# Patient Record
Sex: FEMALE | Race: WHITE | NOT HISPANIC OR LATINO | Employment: PART TIME | URBAN - METROPOLITAN AREA
[De-identification: names, ages, dates, MRNs, and addresses within clinical notes are randomized per-mention and may not be internally consistent; named-entity substitution may affect disease eponyms.]

---

## 2018-02-07 ENCOUNTER — HOSPITAL ENCOUNTER (OUTPATIENT)
Dept: OTHER | Facility: HOSPITAL | Age: 38
Setting detail: SPECIMEN
Discharge: HOME | End: 2018-02-07
Attending: OBSTETRICS & GYNECOLOGY | Admitting: OBSTETRICS & GYNECOLOGY

## 2020-03-18 ENCOUNTER — HOSPITAL ENCOUNTER (OUTPATIENT)
Dept: LABOR AND DELIVERY | Facility: HOSPITAL | Age: 40
Discharge: HOME | End: 2020-03-18

## 2020-03-18 ENCOUNTER — HOSPITAL ENCOUNTER (INPATIENT)
Facility: HOSPITAL | Age: 40
LOS: 1 days | Discharge: HOME | End: 2020-03-19
Attending: OBSTETRICS & GYNECOLOGY | Admitting: OBSTETRICS & GYNECOLOGY

## 2020-03-18 ENCOUNTER — ANESTHESIA EVENT (OUTPATIENT)
Dept: LABOR AND DELIVERY | Facility: HOSPITAL | Age: 40
End: 2020-03-18

## 2020-03-18 ENCOUNTER — ANESTHESIA (OUTPATIENT)
Dept: LABOR AND DELIVERY | Facility: HOSPITAL | Age: 40
End: 2020-03-18

## 2020-03-18 PROBLEM — Z34.90 PREGNANT: Status: ACTIVE | Noted: 2020-03-18

## 2020-03-18 LAB
ABO GROUP BLD: NORMAL
BASOPHILS # BLD AUTO: 0 10*3/MM3 (ref 0–0.2)
BASOPHILS NFR BLD AUTO: 0.3 % (ref 0–1.5)
BLD GP AB SCN SERPL QL: NEGATIVE
DEPRECATED RDW RBC AUTO: 45.5 FL (ref 37–54)
EOSINOPHIL # BLD AUTO: 0.1 10*3/MM3 (ref 0–0.4)
EOSINOPHIL NFR BLD AUTO: 1 % (ref 0.3–6.2)
ERYTHROCYTE [DISTWIDTH] IN BLOOD BY AUTOMATED COUNT: 15.7 % (ref 12.3–15.4)
HCT VFR BLD AUTO: 31.5 % (ref 34–46.6)
HGB BLD-MCNC: 10.8 G/DL (ref 12–15.9)
HIV1+2 AB SER QL: NORMAL
LYMPHOCYTES # BLD AUTO: 2.3 10*3/MM3 (ref 0.7–3.1)
LYMPHOCYTES NFR BLD AUTO: 18.4 % (ref 19.6–45.3)
MCH RBC QN AUTO: 28 PG (ref 26.6–33)
MCHC RBC AUTO-ENTMCNC: 34.1 G/DL (ref 31.5–35.7)
MCV RBC AUTO: 82 FL (ref 79–97)
MONOCYTES # BLD AUTO: 1 10*3/MM3 (ref 0.1–0.9)
MONOCYTES NFR BLD AUTO: 8.3 % (ref 5–12)
NEUTROPHILS # BLD AUTO: 8.8 10*3/MM3 (ref 1.7–7)
NEUTROPHILS NFR BLD AUTO: 72 % (ref 42.7–76)
NRBC BLD AUTO-RTO: 0.1 /100 WBC (ref 0–0.2)
PLATELET # BLD AUTO: 187 10*3/MM3 (ref 140–450)
PMV BLD AUTO: 9.9 FL (ref 6–12)
RBC # BLD AUTO: 3.84 10*6/MM3 (ref 3.77–5.28)
RH BLD: POSITIVE
RPR SER QL: NORMAL
T&S EXPIRATION DATE: NORMAL
WBC NRBC COR # BLD: 12.2 10*3/MM3 (ref 3.4–10.8)

## 2020-03-18 PROCEDURE — 86901 BLOOD TYPING SEROLOGIC RH(D): CPT | Performed by: OBSTETRICS & GYNECOLOGY

## 2020-03-18 PROCEDURE — 85025 COMPLETE CBC W/AUTO DIFF WBC: CPT | Performed by: OBSTETRICS & GYNECOLOGY

## 2020-03-18 PROCEDURE — 86900 BLOOD TYPING SEROLOGIC ABO: CPT

## 2020-03-18 PROCEDURE — 86900 BLOOD TYPING SEROLOGIC ABO: CPT | Performed by: OBSTETRICS & GYNECOLOGY

## 2020-03-18 PROCEDURE — 0HQ9XZZ REPAIR PERINEUM SKIN, EXTERNAL APPROACH: ICD-10-PCS | Performed by: OBSTETRICS & GYNECOLOGY

## 2020-03-18 PROCEDURE — G0432 EIA HIV-1/HIV-2 SCREEN: HCPCS | Performed by: OBSTETRICS & GYNECOLOGY

## 2020-03-18 PROCEDURE — 86850 RBC ANTIBODY SCREEN: CPT | Performed by: OBSTETRICS & GYNECOLOGY

## 2020-03-18 PROCEDURE — 86592 SYPHILIS TEST NON-TREP QUAL: CPT | Performed by: OBSTETRICS & GYNECOLOGY

## 2020-03-18 PROCEDURE — 86901 BLOOD TYPING SEROLOGIC RH(D): CPT

## 2020-03-18 PROCEDURE — C1755 CATHETER, INTRASPINAL: HCPCS | Performed by: ANESTHESIOLOGY

## 2020-03-18 RX ORDER — SODIUM CHLORIDE, SODIUM LACTATE, POTASSIUM CHLORIDE, CALCIUM CHLORIDE 600; 310; 30; 20 MG/100ML; MG/100ML; MG/100ML; MG/100ML
125 INJECTION, SOLUTION INTRAVENOUS CONTINUOUS
Status: DISCONTINUED | OUTPATIENT
Start: 2020-03-18 | End: 2020-03-18

## 2020-03-18 RX ORDER — OXYTOCIN-SODIUM CHLORIDE 0.9% IV SOLN 30 UNIT/500ML 30-0.9/5 UT/ML-%
125 SOLUTION INTRAVENOUS CONTINUOUS PRN
Status: DISCONTINUED | OUTPATIENT
Start: 2020-03-18 | End: 2020-03-18 | Stop reason: HOSPADM

## 2020-03-18 RX ORDER — CARBOPROST TROMETHAMINE 250 UG/ML
250 INJECTION, SOLUTION INTRAMUSCULAR AS NEEDED
Status: DISCONTINUED | OUTPATIENT
Start: 2020-03-18 | End: 2020-03-18 | Stop reason: HOSPADM

## 2020-03-18 RX ORDER — ONDANSETRON 4 MG/1
4 TABLET, FILM COATED ORAL EVERY 6 HOURS PRN
Status: DISCONTINUED | OUTPATIENT
Start: 2020-03-18 | End: 2020-03-18

## 2020-03-18 RX ORDER — LIDOCAINE HYDROCHLORIDE AND EPINEPHRINE 10; 10 MG/ML; UG/ML
INJECTION, SOLUTION INFILTRATION; PERINEURAL AS NEEDED
Status: DISCONTINUED | OUTPATIENT
Start: 2020-03-18 | End: 2020-03-18 | Stop reason: SURG

## 2020-03-18 RX ORDER — EPHEDRINE SULFATE 50 MG/ML
5 INJECTION, SOLUTION INTRAVENOUS
Status: DISCONTINUED | OUTPATIENT
Start: 2020-03-18 | End: 2020-03-18

## 2020-03-18 RX ORDER — IBUPROFEN 600 MG/1
600 TABLET ORAL EVERY 6 HOURS PRN
Status: DISCONTINUED | OUTPATIENT
Start: 2020-03-18 | End: 2020-03-19 | Stop reason: HOSPADM

## 2020-03-18 RX ORDER — ONDANSETRON 2 MG/ML
4 INJECTION INTRAMUSCULAR; INTRAVENOUS EVERY 6 HOURS PRN
Status: DISCONTINUED | OUTPATIENT
Start: 2020-03-18 | End: 2020-03-18

## 2020-03-18 RX ORDER — FOLIC ACID 1 MG/1
1 TABLET ORAL DAILY
COMMUNITY
End: 2020-03-19 | Stop reason: HOSPADM

## 2020-03-18 RX ORDER — PRENATAL VIT NO.126/IRON/FOLIC 28MG-0.8MG
1 TABLET ORAL DAILY
COMMUNITY
End: 2022-08-02

## 2020-03-18 RX ORDER — OXYTOCIN-SODIUM CHLORIDE 0.9% IV SOLN 30 UNIT/500ML 30-0.9/5 UT/ML-%
2 SOLUTION INTRAVENOUS
Status: DISCONTINUED | OUTPATIENT
Start: 2020-03-18 | End: 2020-03-18

## 2020-03-18 RX ORDER — MAGNESIUM CARB/ALUMINUM HYDROX 105-160MG
30 TABLET,CHEWABLE ORAL ONCE
Status: DISCONTINUED | OUTPATIENT
Start: 2020-03-18 | End: 2020-03-18

## 2020-03-18 RX ORDER — BUPIVACAINE HYDROCHLORIDE 2.5 MG/ML
INJECTION, SOLUTION EPIDURAL; INFILTRATION; INTRACAUDAL AS NEEDED
Status: DISCONTINUED | OUTPATIENT
Start: 2020-03-18 | End: 2020-03-18 | Stop reason: SURG

## 2020-03-18 RX ORDER — FLUTICASONE PROPIONATE 50 MCG
SPRAY, SUSPENSION (ML) NASAL DAILY
COMMUNITY
End: 2020-03-19 | Stop reason: HOSPADM

## 2020-03-18 RX ORDER — PRENATAL VIT/IRON FUM/FOLIC AC 27MG-0.8MG
1 TABLET ORAL DAILY
Status: DISCONTINUED | OUTPATIENT
Start: 2020-03-18 | End: 2020-03-19 | Stop reason: HOSPADM

## 2020-03-18 RX ORDER — DOCUSATE SODIUM 100 MG/1
100 CAPSULE, LIQUID FILLED ORAL 2 TIMES DAILY
Status: DISCONTINUED | OUTPATIENT
Start: 2020-03-18 | End: 2020-03-19 | Stop reason: HOSPADM

## 2020-03-18 RX ORDER — BUPIVACAINE HYDROCHLORIDE 2.5 MG/ML
INJECTION, SOLUTION EPIDURAL; INFILTRATION; INTRACAUDAL
Status: COMPLETED
Start: 2020-03-18 | End: 2020-03-18

## 2020-03-18 RX ORDER — SODIUM CHLORIDE 0.9 % (FLUSH) 0.9 %
3 SYRINGE (ML) INJECTION EVERY 12 HOURS SCHEDULED
Status: DISCONTINUED | OUTPATIENT
Start: 2020-03-18 | End: 2020-03-18

## 2020-03-18 RX ORDER — OXYTOCIN-SODIUM CHLORIDE 0.9% IV SOLN 30 UNIT/500ML 30-0.9/5 UT/ML-%
250 SOLUTION INTRAVENOUS CONTINUOUS
Status: ACTIVE | OUTPATIENT
Start: 2020-03-18 | End: 2020-03-18

## 2020-03-18 RX ORDER — SODIUM CHLORIDE 0.9 % (FLUSH) 0.9 %
10 SYRINGE (ML) INJECTION AS NEEDED
Status: DISCONTINUED | OUTPATIENT
Start: 2020-03-18 | End: 2020-03-18

## 2020-03-18 RX ORDER — METHYLERGONOVINE MALEATE 0.2 MG/ML
200 INJECTION INTRAVENOUS ONCE AS NEEDED
Status: DISCONTINUED | OUTPATIENT
Start: 2020-03-18 | End: 2020-03-18 | Stop reason: HOSPADM

## 2020-03-18 RX ORDER — LANOLIN 100 %
OINTMENT (GRAM) TOPICAL
Status: DISCONTINUED | OUTPATIENT
Start: 2020-03-18 | End: 2020-03-19 | Stop reason: HOSPADM

## 2020-03-18 RX ORDER — FAMOTIDINE 20 MG/1
20 TABLET, FILM COATED ORAL NIGHTLY PRN
COMMUNITY
End: 2020-03-19 | Stop reason: HOSPADM

## 2020-03-18 RX ORDER — MISOPROSTOL 200 UG/1
800 TABLET ORAL AS NEEDED
Status: DISCONTINUED | OUTPATIENT
Start: 2020-03-18 | End: 2020-03-18 | Stop reason: HOSPADM

## 2020-03-18 RX ORDER — LIDOCAINE HYDROCHLORIDE 10 MG/ML
5 INJECTION, SOLUTION EPIDURAL; INFILTRATION; INTRACAUDAL; PERINEURAL AS NEEDED
Status: DISCONTINUED | OUTPATIENT
Start: 2020-03-18 | End: 2020-03-18

## 2020-03-18 RX ORDER — ONDANSETRON 4 MG/1
4 TABLET, FILM COATED ORAL EVERY 8 HOURS PRN
Status: DISCONTINUED | OUTPATIENT
Start: 2020-03-18 | End: 2020-03-19 | Stop reason: HOSPADM

## 2020-03-18 RX ORDER — SODIUM CHLORIDE 0.9 % (FLUSH) 0.9 %
1-10 SYRINGE (ML) INJECTION AS NEEDED
Status: DISCONTINUED | OUTPATIENT
Start: 2020-03-18 | End: 2020-03-19 | Stop reason: HOSPADM

## 2020-03-18 RX ORDER — OXYTOCIN-SODIUM CHLORIDE 0.9% IV SOLN 30 UNIT/500ML 30-0.9/5 UT/ML-%
999 SOLUTION INTRAVENOUS ONCE
Status: DISCONTINUED | OUTPATIENT
Start: 2020-03-18 | End: 2020-03-18 | Stop reason: HOSPADM

## 2020-03-18 RX ADMIN — SODIUM CHLORIDE, SODIUM LACTATE, POTASSIUM CHLORIDE, AND CALCIUM CHLORIDE 1000 ML: 600; 310; 30; 20 INJECTION, SOLUTION INTRAVENOUS at 07:51

## 2020-03-18 RX ADMIN — BUPIVACAINE HYDROCHLORIDE 20 MG: 2.5 INJECTION, SOLUTION EPIDURAL; INFILTRATION; INTRACAUDAL; PERINEURAL at 11:32

## 2020-03-18 RX ADMIN — IBUPROFEN 600 MG: 600 TABLET, FILM COATED ORAL at 22:36

## 2020-03-18 RX ADMIN — LIDOCAINE HYDROCHLORIDE,EPINEPHRINE BITARTRATE 3 ML: 10; .01 INJECTION, SOLUTION INFILTRATION; PERINEURAL at 09:52

## 2020-03-18 RX ADMIN — Medication 10 ML/HR: at 09:55

## 2020-03-18 RX ADMIN — SODIUM CHLORIDE, SODIUM LACTATE, POTASSIUM CHLORIDE, AND CALCIUM CHLORIDE 125 ML/HR: 600; 310; 30; 20 INJECTION, SOLUTION INTRAVENOUS at 08:38

## 2020-03-18 RX ADMIN — PRENATAL VIT W/ FE FUMARATE-FA TAB 27-0.8 MG 1 TABLET: 27-0.8 TAB at 15:22

## 2020-03-18 RX ADMIN — DOCUSATE SODIUM 100 MG: 100 CAPSULE, LIQUID FILLED ORAL at 20:13

## 2020-03-18 RX ADMIN — OXYTOCIN 2 MILLI-UNITS/MIN: 10 INJECTION INTRAVENOUS at 07:50

## 2020-03-18 RX ADMIN — WITCH HAZEL 1 PAD: 500 SOLUTION RECTAL; TOPICAL at 15:22

## 2020-03-18 RX ADMIN — Medication: at 22:36

## 2020-03-18 RX ADMIN — IBUPROFEN 600 MG: 600 TABLET, FILM COATED ORAL at 15:22

## 2020-03-18 NOTE — PLAN OF CARE
Pt. Was a vaginal delivery under epidural anethesia- extra bolus was given prior to delivery. Pt. Remains unable to move (L) leg at present  and is unable to assit transfer to wheelchair. Breastfeeding well and fundus firm at -2 below umbilicus.

## 2020-03-18 NOTE — ANESTHESIA PROCEDURE NOTES
Labor Epidural    Pre-sedation assessment completed: 3/18/2020 9:40 AM    Patient reassessed immediately prior to procedure    Patient location during procedure: OB  Start Time: 3/18/2020 9:45 AM  Stop Time: 3/18/2020 9:56 AM  Performed By  Anesthesiologist: Isai Santos MD  Preanesthetic Checklist  Completed: patient identified, site marked, surgical consent, pre-op evaluation, timeout performed, IV checked, risks and benefits discussed and monitors and equipment checked  Prep:  Pt Position:sitting  Sterile Tech:sterile barrier, mask, cap and gloves  Prep:chlorhexidine gluconate and isopropyl alcohol  Monitoring:blood pressure monitoring, continuous pulse oximetry and EKG  Epidural Block Procedure:  Approach:midline  Guidance:landmark technique  Location:L3-L4  Needle Type:Tuohy  Needle Gauge:18 G  Loss of Resistance Medium: air  Loss of Resistance: 7cm  Paresthesia: none  Aspiration:negative  Test Dose:negative  Number of Attempts: 1  Post Assessment:  Dressing:secured with tape, occlusive dressing applied and biopatch applied  Pt Tolerance:patient tolerated the procedure well with no apparent complications  Complications:no

## 2020-03-18 NOTE — ANESTHESIA POSTPROCEDURE EVALUATION
Patient: Carly Baron    Procedure Summary     Date:  03/18/20 Room / Location:      Anesthesia Start:  0941 Anesthesia Stop:  1404    Procedure:  LABOR ANALGESIA Diagnosis:      Scheduled Providers:   Provider:  Isai Santos MD    Anesthesia Type:  epidural ASA Status:  2          Anesthesia Type: epidural    Vitals  Vitals Value Taken Time   /82 3/18/2020  3:46 PM   Temp 98.4 °F (36.9 °C) 3/18/2020  2:45 PM   Pulse 83 3/18/2020  3:46 PM   Resp 17 3/18/2020  3:00 PM   SpO2 98 % 3/18/2020  2:25 PM   Vitals shown include unvalidated device data.        Post Anesthesia Care and Evaluation    Patient location during evaluation: PACU  Patient participation: complete - patient participated  Level of consciousness: awake  Pain scale: See nurse's notes for pain score.  Pain management: adequate  Airway patency: patent  Anesthetic complications: No anesthetic complications  PONV Status: none  Cardiovascular status: acceptable  Respiratory status: acceptable  Hydration status: acceptable    Comments: Patient seen and examined postoperatively; vital signs stable; SpO2 greater than or equal to 90%; cardiopulmonary status stable; nausea/vomiting adequately controlled; pain adequately controlled; no apparent anesthesia complications; patient discharged from anesthesia care when discharge criteria were met

## 2020-03-18 NOTE — H&P
KIMBERLY Hernandez  Obstetric History and Physical     Chief Complaint: Here for induction of labor    Subjective     Patient is a 39 y.o. female  currently at 39w0d, who presents with induction of labor.    Her prenatal care is benign.  Her previous obstetric/gynecological history is noted for is non-contributory.      Prenatal Information:  Prenatal Results     POC Urine Glucose/Protein     Test Value Reference Range Date Time    Urine Glucose        Urine Protein              Initial Prenatal Labs     Test Value Reference Range Date Time    Hemoglobin        Hematocrit        Platelets 187 10*3/mm3 140 - 450 20 0730    Rubella IgG        Hepatitis B SAg        Hepatitis C Ab        RPR        ABO O   20 0730    Rh Positive   20 0730    Antibody Screen        HIV Non-Reactive  Non-Reactive 20 0730    Urine Culture        Gonorrhea        Chlamydia        TSH              2nd and 3rd Trimester     Test Value Reference Range Date Time    Hemoglobin (repeated) 10.8 g/dL 12.0 - 15.9 20 0730    Hematocrit (repeated) 31.5 % 34.0 - 46.6 20 0730    GCT        Antibody Screen (repeated) Negative   20 0730    GTT Fasting        GTT 1 Hr        GTT 2 Hr        GTT 3 Hr        Group B Strep              Drug Screening     Test Value Reference Range Date Time    Amphetamine Screen        Barbiturate Screen        Benzodiazepine Screen        Methadone Screen        Phencyclidine Screen        Opiates Screen        THC Screen        Cocaine Screen        Propoxyphene Screen        Buprenorphine Screen        Methamphetamine Screen        Oxycodone Screen        Tricyclic Antidepressants Screen              Other (Risk screening)     Test Value Reference Range Date Time    Varicella IgG        Parvovirus IgG        CMV IgG        Cystic Fibrosis        Hemoglobin electrophoresis        NIPT        MSAFP-4        AFP (for NTD only)                  External Prenatal Results     Pregnancy  Outside Results - Transcribed From Office Records - See Scanned Records For Details     Test Value Date Time    Hgb 10.8 g/dL 03/18/20 0730    Hct 31.5 % 03/18/20 0730    ABO O  03/18/20 0730    Rh Positive  03/18/20 0730    Antibody Screen Negative  03/18/20 0730    Glucose Fasting GTT       Glucose Tolerance Test 1 hour       Glucose Tolerance Test 3 hour       Gonorrhea (discrete)       Chlamydia (discrete)       RPR       VDRL       Syphilis Antibody       Rubella       HBsAg       Herpes Simplex Virus PCR       Herpes Simplex VIrus Culture       HIV Non-Reactive  03/18/20 0730    Hep C RNA Quant PCR       Hep C Antibody       AFP       Group B Strep       GBS Susceptibility to Clindamycin       GBS Susceptibility to Erythromycin       Fetal Fibronectin       Genetic Testing, Maternal Blood             Drug Screening     Test Value Date Time    Urine Drug Screen       Amphetamine Screen NEGATIVE  12/12/17 2020    Barbiturate Screen NEGATIVE  12/12/17 2020    Benzodiazepine Screen NEGATIVE  12/12/17 2020    Methadone Screen NEGATIVE  12/12/17 2020    Phencyclidine Screen NEGATIVE  12/12/17 2020    Opiates Screen       THC Screen NEGATIVE  12/12/17 2020    Cocaine Screen       Propoxyphene Screen       Buprenorphine Screen       Methamphetamine Screen       Oxycodone Screen       Tricyclic Antidepressants Screen                    Past OB History: 1 prior vaginal birth 9 pound 12 ounce baby       Past Medical History: No past medical history on file.     Past Surgical History Past Surgical History:   Procedure Laterality Date   • SINUS SURGERY     • WISDOM TOOTH EXTRACTION          Family History: No family history on file.   Social History:  reports that she has never smoked. She has never used smokeless tobacco.   reports that she drank alcohol.   reports that she has current or past drug history.        General ROS: Pertinent items are noted in HPI    Objective      Vitals:     Vitals:    03/18/20 1050 03/18/20  1056 03/18/20 1100 03/18/20 1130   BP:   117/76 126/75   Pulse: 83 80 76 84   Resp:       Temp:   98.6 °F (37 °C)    TempSrc:   Axillary    SpO2:   100% 100%       Fetal Heart Rate Assessment:   Category 1    Donovan:   Every 1 to 3 minutes     Physical Exam:     General Appearance:    Alert, cooperative, in no acute distress   Lungs:     Clear to auscultation,respirations regular.    Heart:    Regular rhythm and normal rate.   Breast Exam:    Deferred   Abdomen:     Normal bowel sounds, no masses, soft non-tender,         non-distended, no guarding, no rebound tenderness   Pelvic Exam:   Cervix 3 cm 80% effaced -1 station artificial rupture of membranes complete this morning clear fluid return.  Infant cephalic estimated fetal weight 8-1/2 pounds by Leopold's pelvis adequate    Presentation:     Cervix:    Extremities:   Moves all extremities well, no edema, no cyanosis, no              redness   Skin:   No bleeding, bruising or rash   Neurologic:   No focal neurologic defect          Laboratory Results:   Lab Results (last 48 hours)     Procedure Component Value Units Date/Time    HIV-1 & HIV-2 Antibodies [[id number]]  (Normal) Collected:  03/18/20 0730    Specimen:  Blood Updated:  03/18/20 0840     HIV-1/ HIV-2 Non-Reactive     Comment: A non-reactive test result does not preclude the possibility of exposure to HIV or infection with HIV. An antibody response to recent exposure may take several months to reach detectable levels.       Narrative:       The HIV antibody/antigen combo assay is a qualitative assay for HIV that includes the p24 antigen as well as antibodies to HIV types 1 and 2. This test is intended to be used as a screening assay in the diagnosis of HIV infection in patients over the age of 2.  Results may be falsely decreased if patient taking Biotin.      CBC & Differential [[id number]] Collected:  03/18/20 0730    Specimen:  Blood Updated:  03/18/20 0750    Narrative:       The following orders were  created for panel order CBC & Differential.  Procedure                               Abnormality         Status                     ---------                               -----------         ------                     CBC Auto Differential[[id number]]        Abnormal            Final result                 Please view results for these tests on the individual orders.    CBC Auto Differential [[id number]]  (Abnormal) Collected:  03/18/20 0730    Specimen:  Blood Updated:  03/18/20 0750     WBC 12.20 10*3/mm3      RBC 3.84 10*6/mm3      Hemoglobin 10.8 g/dL      Hematocrit 31.5 %      MCV 82.0 fL      MCH 28.0 pg      MCHC 34.1 g/dL      RDW 15.7 %      RDW-SD 45.5 fl      MPV 9.9 fL      Platelets 187 10*3/mm3      Neutrophil % 72.0 %      Lymphocyte % 18.4 %      Monocyte % 8.3 %      Eosinophil % 1.0 %      Basophil % 0.3 %      Neutrophils, Absolute 8.80 10*3/mm3      Lymphocytes, Absolute 2.30 10*3/mm3      Monocytes, Absolute 1.00 10*3/mm3      Eosinophils, Absolute 0.10 10*3/mm3      Basophils, Absolute 0.00 10*3/mm3      nRBC 0.1 /100 WBC     RPR [[id number]] Collected:  03/18/20 0730    Specimen:  Blood Updated:  03/18/20 0745          Other Studies:      Assessment/Plan     Active Problems:    Pregnant         Assessment:  1.  Intrauterine pregnancy at 39w0d gestation with reactive fetal status.    2.  labor  with ROM and GBS negative   3.  Obstetrical history significant for is non-contributory.  4.  GBS status: No results found for: STREPGPB    Plan:  1. Vaginal anticipated  2. Plan of care has been reviewed with patient.  3.  Risks, benefits of treatment plan have been discussed.  4.  All questions have been answered.  5.  Expect vaginal birth       Orlando Gutierrez MD   3/18/2020   11:58

## 2020-03-18 NOTE — PLAN OF CARE
Pt. Delivererd vaginally under epidural anethesia- remains unable to move (L) leg to assit with transfer to wheelchair. Pt. Was unable to void after recovery and was straight cathed and is due to void at 2000. Breastfeeds well,fundus remains firm at -2 below umbilicus.

## 2020-03-18 NOTE — ANESTHESIA PREPROCEDURE EVALUATION
Anesthesia Evaluation     no history of anesthetic complications:  NPO Solid Status: > 4 hours  NPO Liquid Status: > 4 hours           Airway   Mallampati: II  TM distance: >3 FB  No difficulty expected  Dental - normal exam     Pulmonary    Cardiovascular   Exercise tolerance: good (4-7 METS)        Neuro/Psych  GI/Hepatic/Renal/Endo      Musculoskeletal     Abdominal    Substance History      OB/GYN    (+) Pregnant,         Other                        Anesthesia Plan    ASA 2     epidural       Anesthetic plan, all risks, benefits, and alternatives have been provided, discussed and informed consent has been obtained with: patient.

## 2020-03-18 NOTE — L&D DELIVERY NOTE
[facility]  Vaginal Delivery Note    Diagnosis     Patient is a 39 y.o. female  currently at 39w0d, who presents with elective induction of labor.      Delivery     Delivery:  Spontaneous Vaginal Delivery    Date of Delivery:  3/18/2020   Anesthesia:       Delivering clinician: Orlando Gutierrez MD      Delivery narrative: This patient presented to labor and delivery for induction of labor her starting cervical exam was 3 cm 80% effaced -2 station she was given Pitocin and artificially ruptured with clear fluid return.  She received an epidural for pain control she had a reactive category 1 tracing throughout her course once complete complete +2 station with the urge to push she pushed with excellent effort for approximately 10 minutes delivered over an intact perineum a live viable male infant with Apgars of 9 and 10 at 1 and 5 minutes infant had good cry color and tone was moving all 4 extremities upon delivery.  Infant was bulb suctioned after complete delivery cord was clamped cut the infant was placed on mother's abdomen.  Cord blood was sent for analysis Pitocin was given IV the placenta delivered spontaneously intact with a three-vessel cord and was set aside.  The uterus cervix and vagina were explored and a first-degree laceration was found repaired with 3-0 Vicryl in the normal fashion.  The patient tolerated the procedure well sponge lap needle counts were correct the fundus was firm and hemostasis was noted at the end of the case mom and baby are doing well at the time of this dictation    Infant    Findings: VMI     Apgars:  9 and 10 at 1 and 5 minutes.      Placenta, Cord, and Fluid    Placenta delivered  spontaneous  3VC          Lacerations       had a 1st degree lacteration, which was repair. with 3.0 Vicryl rapide in the usual fashion.     Estimated Blood Loss 300cc     Complications  none    Disposition  Mother to Mother Baby/Postpartum  in stable condition currently.  Baby to remains with  mom  in stable condition currently.      Orlando Gutierrez MD  03/18/20  14:18

## 2020-03-19 VITALS
BODY MASS INDEX: 32.95 KG/M2 | HEIGHT: 70 IN | DIASTOLIC BLOOD PRESSURE: 74 MMHG | HEART RATE: 90 BPM | WEIGHT: 230.16 LBS | SYSTOLIC BLOOD PRESSURE: 113 MMHG | TEMPERATURE: 98.1 F | OXYGEN SATURATION: 99 % | RESPIRATION RATE: 18 BRPM

## 2020-03-19 PROBLEM — Z34.90 PREGNANT: Status: RESOLVED | Noted: 2020-03-18 | Resolved: 2020-03-19

## 2020-03-19 LAB
BASOPHILS # BLD AUTO: 0.1 10*3/MM3 (ref 0–0.2)
BASOPHILS NFR BLD AUTO: 0.4 % (ref 0–1.5)
DEPRECATED RDW RBC AUTO: 45.1 FL (ref 37–54)
EOSINOPHIL # BLD AUTO: 0.1 10*3/MM3 (ref 0–0.4)
EOSINOPHIL NFR BLD AUTO: 0.6 % (ref 0.3–6.2)
ERYTHROCYTE [DISTWIDTH] IN BLOOD BY AUTOMATED COUNT: 15.4 % (ref 12.3–15.4)
HCT VFR BLD AUTO: 29.8 % (ref 34–46.6)
HGB BLD-MCNC: 9.8 G/DL (ref 12–15.9)
LYMPHOCYTES # BLD AUTO: 2.3 10*3/MM3 (ref 0.7–3.1)
LYMPHOCYTES NFR BLD AUTO: 14.9 % (ref 19.6–45.3)
MCH RBC QN AUTO: 26.9 PG (ref 26.6–33)
MCHC RBC AUTO-ENTMCNC: 32.8 G/DL (ref 31.5–35.7)
MCV RBC AUTO: 82.2 FL (ref 79–97)
MONOCYTES # BLD AUTO: 0.8 10*3/MM3 (ref 0.1–0.9)
MONOCYTES NFR BLD AUTO: 5.6 % (ref 5–12)
NEUTROPHILS # BLD AUTO: 12 10*3/MM3 (ref 1.7–7)
NEUTROPHILS NFR BLD AUTO: 78.5 % (ref 42.7–76)
NRBC BLD AUTO-RTO: 0.1 /100 WBC (ref 0–0.2)
PLATELET # BLD AUTO: 179 10*3/MM3 (ref 140–450)
PMV BLD AUTO: 9.7 FL (ref 6–12)
RBC # BLD AUTO: 3.62 10*6/MM3 (ref 3.77–5.28)
WBC NRBC COR # BLD: 15.2 10*3/MM3 (ref 3.4–10.8)

## 2020-03-19 PROCEDURE — 85025 COMPLETE CBC W/AUTO DIFF WBC: CPT | Performed by: OBSTETRICS & GYNECOLOGY

## 2020-03-19 RX ORDER — IBUPROFEN 600 MG/1
600 TABLET ORAL EVERY 6 HOURS PRN
Qty: 30 TABLET | Refills: 0 | Status: SHIPPED | OUTPATIENT
Start: 2020-03-19 | End: 2022-08-02

## 2020-03-19 RX ADMIN — DOCUSATE SODIUM 100 MG: 100 CAPSULE, LIQUID FILLED ORAL at 09:45

## 2020-03-19 RX ADMIN — PRENATAL VIT W/ FE FUMARATE-FA TAB 27-0.8 MG 1 TABLET: 27-0.8 TAB at 09:45

## 2020-03-19 RX ADMIN — IBUPROFEN 600 MG: 600 TABLET, FILM COATED ORAL at 11:28

## 2020-03-19 RX ADMIN — IBUPROFEN 600 MG: 600 TABLET, FILM COATED ORAL at 04:50

## 2020-03-19 RX ADMIN — BENZOCAINE 1 SPRAY: 11.4 AEROSOL, SPRAY TOPICAL at 08:25

## 2020-03-19 NOTE — PLAN OF CARE
Pt controlling pain with PRN motrin. Breastfeeding baby successfully every 2-3 hours. Having some nipple discomfort with feedings. Talked to NP about plan. Planning on early discharge this afternoon once baby clears tests.

## 2020-03-19 NOTE — PLAN OF CARE
Problem: Patient Care Overview  Goal: Plan of Care Review  Outcome: Ongoing (interventions implemented as appropriate)  Flowsheets (Taken 3/19/2020 7299)  Progress: improving  Plan of Care Reviewed With: patient  Outcome Summary: Pt able to rest throughout night. Motrin given for pain control. Pt voiding wnl. No complications at this time. Breastfeeding well. Will continue to monitor  Goal: Individualization and Mutuality  Outcome: Ongoing (interventions implemented as appropriate)  Goal: Discharge Needs Assessment  Outcome: Ongoing (interventions implemented as appropriate)  Goal: Interprofessional Rounds/Family Conf  Outcome: Ongoing (interventions implemented as appropriate)     Problem: Postpartum (Vaginal Delivery) (Adult,Obstetrics,Pediatric)  Goal: Signs and Symptoms of Listed Potential Problems Will be Absent, Minimized or Managed (Postpartum)  Outcome: Ongoing (interventions implemented as appropriate)      Mother encouraged to provide breastmilk for her NICU baby. Benefits of breastmilk and early stimulation discussed with mother. Mother declines pumping at this time. Mother states she is just too exhausted and has been up for 23 hours. Mother encouraged to inform nurse if she changes her mind.

## 2020-03-19 NOTE — DISCHARGE SUMMARY
[facility]  Delivery Discharge Summary    Primary OB Clinician: Orlando Gutierrez MD    Admission Diagnosis: TIUP  Active Problems:    * No active hospital problems. *      Discharge Diagnosis:  Same; delivered    Gestational Age: 39w0d    Date of Delivery: 3/18/2020     Delivered By:        Delivery Type: Vaginal, Spontaneous      Tubal Ligation: n/a    Intrapartum Course: Uncomplicated delivery.     Postpartum Course:  Pt was admitted and underwent  Spontaneous Vaginal Delivery. Pt was transferred to PP where she had an uncomplicated course. Pt remained AFVSS, had scant lochia and pain was well controlled. Pt d/c home in stable condition and will f/u in office for PP visit as scheduled or PRN. Currently breastfeeding. Plans on OCP  for contraception.     Physical Exam:    Vitals:   Vitals:    20 2237 20 0240 20 0241 20 0638   BP: 125/79  118/73 115/75   BP Location: Right arm  Right arm Right arm   Patient Position: Sitting  Lying Sitting   Pulse: 105  89 88   Resp: 16 16 23 16   Temp: 97.8 °F (36.6 °C)  98.5 °F (36.9 °C) 97.7 °F (36.5 °C)   TempSrc: Oral  Oral Oral   SpO2: 97%  98% 98%   Weight:       Height:         Temp (24hrs), Av.2 °F (36.8 °C), Min:97.7 °F (36.5 °C), Max:98.6 °F (37 °C)      General Appearance:    Alert, cooperative, in no acute distress   Abdomen:     Soft non-tender, non-distended, no guarding, no rebound         tenderness.   Extremities:   Moves all extremities well, no edema, no cyanosis, no              Redness.   Incision:  N/A   Fundus:   Firm, below umbilicus     Feeding method: Breastfeeding Status: Yes    Labs:  Results from last 7 days   Lab Units 20  0730   WBC 10*3/mm3 12.20*   HEMOGLOBIN g/dL 10.8*   HEMATOCRIT % 31.5*   PLATELETS 10*3/mm3 187           Blood Type: RH Positive      Plan:  Discharge to home.    Follow-up appointment with Dr Gutierrez in 6 weeks. Will check PP CBC prior to discharge. Pt asymptomatic at time of d/c.     Wanda  MILLICENT Barajas  3/19/2020  10:14

## 2020-03-19 NOTE — LACTATION NOTE
This note was copied from a baby's chart.  Pt denies hx of breast surgery, no allergy to wool or foods. Medela gel patches provided, instructed.  States she bf her 4 yo x 4 mo, plans to return to work in about 8 weeks, she has a spectra pump.  Teaching done. Assisted to position, demo wide latch. Nursing readily. Mild nipple tenderness, skin care products in use.  Plans d/c home today, will follow up as needed.

## 2021-04-26 RX ORDER — ESCITALOPRAM OXALATE 20 MG/1
20 TABLET ORAL DAILY
Qty: 30 TABLET | Refills: 1 | Status: CANCELLED | OUTPATIENT
Start: 2021-04-26

## 2021-04-26 RX ORDER — NORGESTIMATE AND ETHINYL ESTRADIOL 7DAYSX3 LO
1 KIT ORAL DAILY
Qty: 84 TABLET | Refills: 1 | Status: CANCELLED | OUTPATIENT
Start: 2021-04-26

## 2021-05-03 RX ORDER — NORGESTIMATE AND ETHINYL ESTRADIOL 7DAYSX3 LO
1 KIT ORAL DAILY
Qty: 84 TABLET | Refills: 1 | Status: CANCELLED | OUTPATIENT
Start: 2021-04-26

## 2021-05-03 RX ORDER — ESCITALOPRAM OXALATE 20 MG/1
20 TABLET ORAL DAILY
Qty: 30 TABLET | Refills: 1 | Status: CANCELLED | OUTPATIENT
Start: 2021-04-26

## 2022-08-02 ENCOUNTER — OFFICE VISIT (OUTPATIENT)
Dept: FAMILY MEDICINE CLINIC | Facility: CLINIC | Age: 42
End: 2022-08-02

## 2022-08-02 VITALS
HEIGHT: 70 IN | SYSTOLIC BLOOD PRESSURE: 108 MMHG | BODY MASS INDEX: 33.93 KG/M2 | DIASTOLIC BLOOD PRESSURE: 80 MMHG | WEIGHT: 237 LBS | HEART RATE: 78 BPM | OXYGEN SATURATION: 97 %

## 2022-08-02 DIAGNOSIS — J30.1 SEASONAL ALLERGIC RHINITIS DUE TO POLLEN: ICD-10-CM

## 2022-08-02 DIAGNOSIS — Z12.31 BREAST CANCER SCREENING BY MAMMOGRAM: ICD-10-CM

## 2022-08-02 DIAGNOSIS — R06.83 LOUD SNORING: ICD-10-CM

## 2022-08-02 DIAGNOSIS — R53.83 FATIGUE, UNSPECIFIED TYPE: ICD-10-CM

## 2022-08-02 DIAGNOSIS — F41.9 ANXIETY: ICD-10-CM

## 2022-08-02 DIAGNOSIS — F32.0 CURRENT MILD EPISODE OF MAJOR DEPRESSIVE DISORDER WITHOUT PRIOR EPISODE: ICD-10-CM

## 2022-08-02 DIAGNOSIS — Z00.00 PREVENTATIVE HEALTH CARE: Primary | ICD-10-CM

## 2022-08-02 LAB — HBA1C MFR BLD: 4.9 % (ref 3.5–5.6)

## 2022-08-02 PROCEDURE — 86803 HEPATITIS C AB TEST: CPT | Performed by: NURSE PRACTITIONER

## 2022-08-02 PROCEDURE — 82607 VITAMIN B-12: CPT | Performed by: NURSE PRACTITIONER

## 2022-08-02 PROCEDURE — 99386 PREV VISIT NEW AGE 40-64: CPT | Performed by: NURSE PRACTITIONER

## 2022-08-02 PROCEDURE — 80050 GENERAL HEALTH PANEL: CPT | Performed by: NURSE PRACTITIONER

## 2022-08-02 PROCEDURE — 36415 COLL VENOUS BLD VENIPUNCTURE: CPT | Performed by: NURSE PRACTITIONER

## 2022-08-02 PROCEDURE — 82306 VITAMIN D 25 HYDROXY: CPT | Performed by: NURSE PRACTITIONER

## 2022-08-02 PROCEDURE — 83036 HEMOGLOBIN GLYCOSYLATED A1C: CPT | Performed by: NURSE PRACTITIONER

## 2022-08-02 PROCEDURE — 80061 LIPID PANEL: CPT | Performed by: NURSE PRACTITIONER

## 2022-08-02 RX ORDER — DIPHENOXYLATE HYDROCHLORIDE AND ATROPINE SULFATE 2.5; .025 MG/1; MG/1
TABLET ORAL DAILY
COMMUNITY

## 2022-08-02 RX ORDER — HYDROXYZINE HYDROCHLORIDE 10 MG/1
10 TABLET, FILM COATED ORAL 3 TIMES DAILY PRN
Qty: 30 TABLET | Refills: 1 | Status: SHIPPED | OUTPATIENT
Start: 2022-08-02 | End: 2023-03-16 | Stop reason: SDUPTHER

## 2022-08-02 RX ORDER — FLUOXETINE 10 MG/1
10 CAPSULE ORAL DAILY
Qty: 30 CAPSULE | Refills: 1 | Status: SHIPPED | OUTPATIENT
Start: 2022-08-02 | End: 2022-09-14 | Stop reason: SDUPTHER

## 2022-08-02 NOTE — PROGRESS NOTES
"Chief Complaint  Chief Complaint   Patient presents with   • Annual Exam     Would like a mammo order if she's due.   • Establish Care   • Anxiety     Has been taking lexapro but would like to try something else.   • Sleep Apnea     She reports her dentist said she should have sleep study.  Reports she snores loudly, fatigue, teeth grinding.           Subjective          Carly Baron presents to [facility] for   History of Present Illness     New 41-year-old female patient presents today for annual exam and to establish care with new provider.  She has past medical history of seasonal allergies, anxiety and depression.  The patient denies dysuria, constipation, GERD, chest pain, shortness of air, palpitations and swelling of the extremities.     She complains of loud snoring, severe daytime fatigue and grinding of her teeth. Her  has told her she snores, is unsure of witnessed apnea.    Anxiety/depression, patient was started on Lexapro by GYN following delivery of her last child, she ran out of refills and has been off medication for approx 2mo. She reports having some improvement on Lexapro but still worried and was extremely anxious. She states she worries all the time, her father  at young age, mother recently. Has 2 children and constantly worries about them and \"what if something would happen to her\".  She is a stress eater, typically when anxious she snacks and reports weight gain. Patient reports fatigue, impaired concentration, trouble falling asleep, she uses CBD nightly.  Denies psychomotor agitation, psychomotor retardation, feelings of worthlessness (guilt), thoughts of death or suicide.       She follows with OB/GYN for Pap smears, currently on OCP    Allergic rhinitis, stable on OTC allergy meds, reports seasonal rhinitis, postnasal drip currently controlled, denies symptoms of asthma including cough, wheezing, shortness of air and dizziness.  "     Left ankle inj after a fall at [employer], has been applying ice, wearing a brace and symptoms improving.       The following portions of the patient's history were reviewed and updated as appropriate: allergies, current medications, past family history, past medical history, past social history, past surgical history and problem list.    Past Medical History:   Diagnosis Date   • Allergic    • Anxiety    • Depression      Past Surgical History:   Procedure Laterality Date   • SINUS SURGERY     • WISDOM TOOTH EXTRACTION       Family History   Problem Relation Age of Onset   • Lung cancer Mother    • Early death Father    • Hypertension Father    • Cataracts Father    • No Known Problems Brother    • No Known Problems Brother    • No Known Problems Son    • No Known Problems Son    • No Known Problems Maternal Aunt    • No Known Problems Maternal Uncle    • Bone cancer Paternal Aunt    • No Known Problems Maternal Grandmother    • Parkinsonism Maternal Grandfather    • Parkinsonism Paternal Grandmother    • Heart disease Paternal Grandfather    • Diabetes Paternal Grandfather      Social History     Tobacco Use   • Smoking status: Never Smoker   • Smokeless tobacco: Never Used   Substance Use Topics   • Alcohol use: Not Currently       Current Outpatient Medications:   •  CBD (cannabidiol) oral oil, Take  by mouth., Disp: , Rfl:   •  Fluticasone Propionate (FLONASE NA), into the nostril(s) as directed by provider., Disp: , Rfl:   •  Loratadine (CLARITIN PO), Take  by mouth., Disp: , Rfl:   •  multivitamin (MULTIVITAMIN PO), Take  by mouth Daily., Disp: , Rfl:   •  Naproxen Sodium (ALEVE PO), Take  by mouth., Disp: , Rfl:   •  valACYclovir (VALTREX) 1000 MG tablet, Take 1 tablet by mouth daily, Disp: 30 tablet, Rfl: 6  •  FLUoxetine (PROzac) 10 MG capsule, Take 1 capsule by mouth Daily., Disp: 30 capsule, Rfl: 1  •  hydrOXYzine (ATARAX) 10 MG tablet, Take 1 tablet by mouth 3 (Three) Times a Day As Needed for  "Anxiety., Disp: 30 tablet, Rfl: 1  •  norgestimate-ethinyl estradiol (ORTHO TRI-CYCLEN LO) 0.18/0.215/0.25 MG-25 MCG per tablet, Take 1 tablet by mouth daily, Disp: 84 tablet, Rfl: 4    Objective   Vital Signs:   /80 (BP Location: Right arm, Patient Position: Sitting, Cuff Size: Large Adult)   Pulse 78   Ht 177.8 cm (70\")   Wt 108 kg (237 lb)   SpO2 97%   BMI 34.01 kg/m²           Physical Exam  Vitals and nursing note reviewed.   Constitutional:       General: She is not in acute distress.     Appearance: Normal appearance. She is well-developed. She is obese. She is not diaphoretic.   HENT:      Head: Normocephalic.      Nose: No congestion.      Mouth/Throat:      Mouth: Mucous membranes are moist.   Eyes:      Conjunctiva/sclera: Conjunctivae normal.      Pupils: Pupils are equal, round, and reactive to light.   Neck:      Thyroid: No thyromegaly.      Vascular: No JVD.   Cardiovascular:      Rate and Rhythm: Normal rate and regular rhythm.      Heart sounds: Normal heart sounds. No murmur heard.  Pulmonary:      Effort: Pulmonary effort is normal. No respiratory distress.      Breath sounds: Normal breath sounds. No wheezing or rhonchi.   Abdominal:      General: Bowel sounds are normal. There is no distension.      Palpations: Abdomen is soft.      Tenderness: There is no abdominal tenderness.   Musculoskeletal:         General: Signs of injury (Wearing supportive splint/brace on left ankle) present. No swelling or tenderness. Normal range of motion.      Cervical back: Normal range of motion and neck supple. No rigidity.   Skin:     General: Skin is warm and dry.      Coloration: Skin is not pale.   Neurological:      Mental Status: She is alert and oriented to person, place, and time.      Sensory: No sensory deficit.      Motor: No weakness.      Gait: Gait normal.   Psychiatric:         Attention and Perception: Attention normal.         Mood and Affect: Mood is anxious and depressed. Affect is " tearful.         Behavior: Behavior normal.         Thought Content: Thought content normal.         Judgment: Judgment normal.          Result Review :     No visits with results within 7 Day(s) from this visit.   Latest known visit with results is:   Lab Requisition on 07/19/2022   Component Date Value Ref Range Status   • Varicella IgG 07/19/2022 Immune  Immune Final   • Mumps IgG 07/19/2022 Immune  Immune Final     -year-old F related to red (all the stuff she does about yeah              BMI is >= 30 and <35. (Class 1 Obesity). The following options were offered after discussion;: exercise counseling/recommendations and nutrition counseling/recommendations           Assessment and Plan    Diagnoses and all orders for this visit:    1. Preventative health care (Primary)  -     Comprehensive Metabolic Panel  -     CBC (No Diff)  -     Hemoglobin A1c  -     Lipid Panel  -     TSH  -     Vitamin B12  -     Vitamin D 25 Hydroxy  -     Hepatitis C Antibody    2. Loud snoring  -     Home Sleep Study; Future    3. Fatigue, unspecified type  -     Vitamin B12  -     Vitamin D 25 Hydroxy    4. Anxiety  -     Vitamin B12  -     Vitamin D 25 Hydroxy    5. Current mild episode of major depressive disorder without prior episode (HCC)    6. Seasonal allergic rhinitis due to pollen    7. Breast cancer screening by mammogram  -     Mammo Screening Digital Tomosynthesis Bilateral With CAD; Future    Other orders  -     FLUoxetine (PROzac) 10 MG capsule; Take 1 capsule by mouth Daily.  Dispense: 30 capsule; Refill: 1  -     hydrOXYzine (ATARAX) 10 MG tablet; Take 1 tablet by mouth 3 (Three) Times a Day As Needed for Anxiety.  Dispense: 30 tablet; Refill: 1      -Age appropriate preventative counseling provided, including healthy lifestyle modifications and exercise  -Labs and mammo ordered, will notify results  -Check home sleep study, will order cpap if indicated.   -rec start prozac daily, add hydroxyzine prn  -Continue with  OB/GYN for Pap smears and OCP  -Continue OTC AR medications      I spent 30 minutes caring for Carly Baron on this date of service. This time includes time spent by me in the following activities: preparing for the visit, reviewing tests, performing a medically appropriate examination and/or evaluation , counseling and educating the patient/family/caregiver, ordering medications, tests, or procedures and documenting information in the medical record        Follow Up     Return in about 6 weeks (around 9/13/2022) for Recheck anxiety/depression.  Patient was given instructions and counseling regarding her condition or for health maintenance advice. Please see specific information pulled into the AVS if appropriate.        Part of this note may be an electronic transcription/translation of spoken language to printed text using the Dragon Dictation System

## 2022-08-03 LAB
25(OH)D3 SERPL-MCNC: 48.3 NG/ML (ref 30–100)
ALBUMIN SERPL-MCNC: 5 G/DL (ref 3.5–5.2)
ALBUMIN/GLOB SERPL: 1.9 G/DL
ALP SERPL-CCNC: 79 U/L (ref 39–117)
ALT SERPL W P-5'-P-CCNC: 16 U/L (ref 1–33)
ANION GAP SERPL CALCULATED.3IONS-SCNC: 12.3 MMOL/L (ref 5–15)
AST SERPL-CCNC: 26 U/L (ref 1–32)
BILIRUB SERPL-MCNC: 0.4 MG/DL (ref 0–1.2)
BUN SERPL-MCNC: 17 MG/DL (ref 6–20)
BUN/CREAT SERPL: 19.3 (ref 7–25)
CALCIUM SPEC-SCNC: 9.4 MG/DL (ref 8.6–10.5)
CHLORIDE SERPL-SCNC: 105 MMOL/L (ref 98–107)
CHOLEST SERPL-MCNC: 134 MG/DL (ref 0–200)
CO2 SERPL-SCNC: 23.7 MMOL/L (ref 22–29)
CREAT SERPL-MCNC: 0.88 MG/DL (ref 0.57–1)
DEPRECATED RDW RBC AUTO: 38.7 FL (ref 37–54)
EGFRCR SERPLBLD CKD-EPI 2021: 85.3 ML/MIN/1.73
ERYTHROCYTE [DISTWIDTH] IN BLOOD BY AUTOMATED COUNT: 12.7 % (ref 12.3–15.4)
GLOBULIN UR ELPH-MCNC: 2.7 GM/DL
GLUCOSE SERPL-MCNC: 110 MG/DL (ref 65–99)
HCT VFR BLD AUTO: 39.2 % (ref 34–46.6)
HCV AB SER DONR QL: NORMAL
HDLC SERPL-MCNC: 46 MG/DL (ref 40–60)
HGB BLD-MCNC: 13.3 G/DL (ref 12–15.9)
LDLC SERPL CALC-MCNC: 60 MG/DL (ref 0–100)
LDLC/HDLC SERPL: 1.18 {RATIO}
MCH RBC QN AUTO: 29.3 PG (ref 26.6–33)
MCHC RBC AUTO-ENTMCNC: 33.9 G/DL (ref 31.5–35.7)
MCV RBC AUTO: 86.3 FL (ref 79–97)
PLATELET # BLD AUTO: 262 10*3/MM3 (ref 140–450)
PMV BLD AUTO: 12.2 FL (ref 6–12)
POTASSIUM SERPL-SCNC: 4.2 MMOL/L (ref 3.5–5.2)
PROT SERPL-MCNC: 7.7 G/DL (ref 6–8.5)
RBC # BLD AUTO: 4.54 10*6/MM3 (ref 3.77–5.28)
SODIUM SERPL-SCNC: 141 MMOL/L (ref 136–145)
TRIGL SERPL-MCNC: 168 MG/DL (ref 0–150)
TSH SERPL DL<=0.05 MIU/L-ACNC: 0.68 UIU/ML (ref 0.27–4.2)
VIT B12 BLD-MCNC: 693 PG/ML (ref 211–946)
VLDLC SERPL-MCNC: 28 MG/DL (ref 5–40)
WBC NRBC COR # BLD: 8.8 10*3/MM3 (ref 3.4–10.8)

## 2022-08-04 ENCOUNTER — PATIENT ROUNDING (BHMG ONLY) (OUTPATIENT)
Dept: FAMILY MEDICINE CLINIC | Facility: CLINIC | Age: 42
End: 2022-08-04

## 2022-08-06 ENCOUNTER — HOSPITAL ENCOUNTER (OUTPATIENT)
Dept: MAMMOGRAPHY | Facility: HOSPITAL | Age: 42
Discharge: HOME | End: 2022-08-06
Admitting: NURSE PRACTITIONER

## 2022-08-06 DIAGNOSIS — Z12.31 BREAST CANCER SCREENING BY MAMMOGRAM: ICD-10-CM

## 2022-08-06 PROCEDURE — 77067 SCR MAMMO BI INCL CAD: CPT

## 2022-08-06 PROCEDURE — 77063 BREAST TOMOSYNTHESIS BI: CPT

## 2022-08-15 ENCOUNTER — TELEPHONE (OUTPATIENT)
Dept: FAMILY MEDICINE CLINIC | Facility: CLINIC | Age: 42
End: 2022-08-15

## 2022-09-14 ENCOUNTER — OFFICE VISIT (OUTPATIENT)
Dept: FAMILY MEDICINE CLINIC | Facility: CLINIC | Age: 42
End: 2022-09-14

## 2022-09-14 VITALS
BODY MASS INDEX: 33.93 KG/M2 | HEART RATE: 93 BPM | WEIGHT: 237 LBS | SYSTOLIC BLOOD PRESSURE: 118 MMHG | DIASTOLIC BLOOD PRESSURE: 80 MMHG | OXYGEN SATURATION: 99 % | HEIGHT: 70 IN | TEMPERATURE: 98.4 F

## 2022-09-14 DIAGNOSIS — G47.33 MILD OBSTRUCTIVE SLEEP APNEA: ICD-10-CM

## 2022-09-14 DIAGNOSIS — J30.2 SEASONAL ALLERGIC RHINITIS, UNSPECIFIED TRIGGER: ICD-10-CM

## 2022-09-14 DIAGNOSIS — F32.0 CURRENT MILD EPISODE OF MAJOR DEPRESSIVE DISORDER WITHOUT PRIOR EPISODE: ICD-10-CM

## 2022-09-14 DIAGNOSIS — R53.83 FATIGUE, UNSPECIFIED TYPE: ICD-10-CM

## 2022-09-14 DIAGNOSIS — F41.9 ANXIETY: Primary | ICD-10-CM

## 2022-09-14 PROCEDURE — 99214 OFFICE O/P EST MOD 30 MIN: CPT | Performed by: NURSE PRACTITIONER

## 2022-09-14 RX ORDER — FLUOXETINE HYDROCHLORIDE 20 MG/1
20 CAPSULE ORAL DAILY
Qty: 90 CAPSULE | Refills: 0 | Status: SHIPPED | OUTPATIENT
Start: 2022-09-14 | End: 2022-12-14 | Stop reason: SDUPTHER

## 2022-09-14 NOTE — PROGRESS NOTES
Chief Complaint  Chief Complaint   Patient presents with   • Follow-up     6 week F/U   • Depression   • Anxiety     Wants to know about increasing dose of fluoxetine.           Subjective          Carly Baron presents to [facility] for   History of Present Illness    Anxiety/depression, patient was started on Prozac 8/2/2022, she presents today for 6-week follow-up. She reports some improvement in condition, she is not over-eating now and feels more calm. She has used hydroxyzine a few times as needed which has been effective, she also uses CBD at night.  She still worries about her health, her children and family.  She does report still experiencing fatigue.     Home sleep study was completed which shows mild obstructive sleep apnea, order for CPAP has been sent to [facility], pt has not heard from them yet.     She reports having allergic rhinitis and occasional headaches over the last 1 to 2 weeks, takes OTC allergy meds      The following portions of the patient's history were reviewed and updated as appropriate: allergies, current medications, past family history, past medical history, past social history, past surgical history and problem list.    Past Medical History:   Diagnosis Date   • Allergic    • Anxiety    • Depression    • Glaucoma 2021   • Low back pain 2012     Past Surgical History:   Procedure Laterality Date   • SINUS SURGERY     • WISDOM TOOTH EXTRACTION       Family History   Problem Relation Age of Onset   • Lung cancer Mother    • Cancer Mother    • Stroke Mother    • Early death Father    • Hypertension Father    • Cataracts Father    • No Known Problems Brother    • No Known Problems Brother    • No Known Problems Son    • No Known Problems Son    • No Known Problems Maternal Aunt    • No Known Problems Maternal Uncle    • Bone cancer Paternal Aunt    • No Known Problems Maternal Grandmother    • Parkinsonism Maternal Grandfather    • Parkinsonism  "Paternal Grandmother    • Heart disease Paternal Grandfather    • Diabetes Paternal Grandfather      Social History     Tobacco Use   • Smoking status: Never Smoker   • Smokeless tobacco: Never Used   Substance Use Topics   • Alcohol use: Not Currently       Current Outpatient Medications:   •  CBD (cannabidiol) oral oil, Take  by mouth., Disp: , Rfl:   •  FLUoxetine (PROzac) 20 MG capsule, Take 1 capsule by mouth Daily., Disp: 90 capsule, Rfl: 0  •  Fluticasone Propionate (FLONASE NA), into the nostril(s) as directed by provider., Disp: , Rfl:   •  hydrOXYzine (ATARAX) 10 MG tablet, Take 1 tablet by mouth 3 (Three) Times a Day As Needed for Anxiety., Disp: 30 tablet, Rfl: 1  •  Loratadine (CLARITIN PO), Take  by mouth., Disp: , Rfl:   •  multivitamin (THERAGRAN) tablet tablet, Take  by mouth Daily., Disp: , Rfl:   •  Naproxen Sodium (ALEVE PO), Take  by mouth., Disp: , Rfl:   •  norgestimate-ethinyl estradiol (ORTHO TRI-CYCLEN LO) 0.18/0.215/0.25 MG-25 MCG per tablet, Take 1 tablet by mouth daily, Disp: 84 tablet, Rfl: 4  •  valACYclovir (VALTREX) 1000 MG tablet, Take 1 tablet by mouth daily, Disp: 30 tablet, Rfl: 6    Objective   Vital Signs:   /80 (BP Location: Left arm, Patient Position: Sitting, Cuff Size: Large Adult)   Pulse 93   Temp 98.4 °F (36.9 °C) (Temporal)   Ht 177.8 cm (70\")   Wt 108 kg (237 lb)   SpO2 99%   BMI 34.01 kg/m²           Physical Exam  Vitals and nursing note reviewed.   Constitutional:       General: She is not in acute distress.     Appearance: She is well-developed. She is not diaphoretic.   Eyes:      Pupils: Pupils are equal, round, and reactive to light.   Neck:      Thyroid: No thyromegaly.      Vascular: No JVD.   Musculoskeletal:         General: No tenderness. Normal range of motion.      Cervical back: Normal range of motion and neck supple.   Skin:     General: Skin is warm and dry.   Neurological:      Mental Status: She is alert and oriented to person, place, and " time.      Sensory: No sensory deficit.   Psychiatric:         Mood and Affect: Mood normal.         Behavior: Behavior normal.         Thought Content: Thought content normal.         Judgment: Judgment normal.          Result Review :     No visits with results within 7 Day(s) from this visit.   Latest known visit with results is:   Office Visit on 08/02/2022   Component Date Value Ref Range Status   • Glucose 08/02/2022 110 (A) 65 - 99 mg/dL Final   • BUN 08/02/2022 17  6 - 20 mg/dL Final   • Creatinine 08/02/2022 0.88  0.57 - 1.00 mg/dL Final   • Sodium 08/02/2022 141  136 - 145 mmol/L Final   • Potassium 08/02/2022 4.2  3.5 - 5.2 mmol/L Final   • Chloride 08/02/2022 105  98 - 107 mmol/L Final   • CO2 08/02/2022 23.7  22.0 - 29.0 mmol/L Final   • Calcium 08/02/2022 9.4  8.6 - 10.5 mg/dL Final   • Total Protein 08/02/2022 7.7  6.0 - 8.5 g/dL Final   • Albumin 08/02/2022 5.00  3.50 - 5.20 g/dL Final   • ALT (SGPT) 08/02/2022 16  1 - 33 U/L Final   • AST (SGOT) 08/02/2022 26  1 - 32 U/L Final   • Alkaline Phosphatase 08/02/2022 79  39 - 117 U/L Final   • Total Bilirubin 08/02/2022 0.4  0.0 - 1.2 mg/dL Final   • Globulin 08/02/2022 2.7  gm/dL Final   • A/G Ratio 08/02/2022 1.9  g/dL Final   • BUN/Creatinine Ratio 08/02/2022 19.3  7.0 - 25.0 Final   • Anion Gap 08/02/2022 12.3  5.0 - 15.0 mmol/L Final   • eGFR 08/02/2022 85.3  >60.0 mL/min/1.73 Final    National Kidney Foundation and American Society of Nephrology (ASN) Task Force recommended calculation based on the Chronic Kidney Disease Epidemiology Collaboration (CKD-EPI) equation refit without adjustment for race.   • WBC 08/02/2022 8.80  3.40 - 10.80 10*3/mm3 Final   • RBC 08/02/2022 4.54  3.77 - 5.28 10*6/mm3 Final   • Hemoglobin 08/02/2022 13.3  12.0 - 15.9 g/dL Final   • Hematocrit 08/02/2022 39.2  34.0 - 46.6 % Final   • MCV 08/02/2022 86.3  79.0 - 97.0 fL Final   • MCH 08/02/2022 29.3  26.6 - 33.0 pg Final   • MCHC 08/02/2022 33.9  31.5 - 35.7 g/dL Final    • RDW 08/02/2022 12.7  12.3 - 15.4 % Final   • RDW-SD 08/02/2022 38.7  37.0 - 54.0 fl Final   • MPV 08/02/2022 12.2 (A) 6.0 - 12.0 fL Final   • Platelets 08/02/2022 262  140 - 450 10*3/mm3 Final   • Hemoglobin A1C 08/02/2022 4.9  3.5 - 5.6 % Final   • Total Cholesterol 08/02/2022 134  0 - 200 mg/dL Final   • Triglycerides 08/02/2022 168 (A) 0 - 150 mg/dL Final   • HDL Cholesterol 08/02/2022 46  40 - 60 mg/dL Final   • LDL Cholesterol  08/02/2022 60  0 - 100 mg/dL Final   • VLDL Cholesterol 08/02/2022 28  5 - 40 mg/dL Final   • LDL/HDL Ratio 08/02/2022 1.18   Final   • TSH 08/02/2022 0.681  0.270 - 4.200 uIU/mL Final   • Vitamin B-12 08/02/2022 693  211 - 946 pg/mL Final   • 25 Hydroxy, Vitamin D 08/02/2022 48.3  30.0 - 100.0 ng/ml Final   • Hepatitis C Ab 08/02/2022 Non-Reactive  Non-Reactive Final                  BMI is >= 30 and <35. (Class 1 Obesity). The following options were offered after discussion;: exercise counseling/recommendations and nutrition counseling/recommendations           Assessment and Plan    Diagnoses and all orders for this visit:    1. Anxiety (Primary)  Comments:  Somewhat improved, recommend increasing Prozac to 20 mg, okay to continue hydroxyzine as needed    2. Current mild episode of major depressive disorder without prior episode (HCC)    3. Mild obstructive sleep apnea  Comments:  Order sent to [facility] for CPAP, will check status. pt to start if approved by insurance, will then need 6 week f/u.     4. Fatigue, unspecified type  Comments:  Consider hormone work-up if still experiencing severe fatigue following CPAP and control of anxiety/depression    5. Seasonal allergic rhinitis, unspecified trigger  Comments:  Recommend continue allergy medications, Tylenol or ibuprofen as needed for headaches    Other orders  -     FLUoxetine (PROzac) 20 MG capsule; Take 1 capsule by mouth Daily.  Dispense: 90 capsule; Refill: 0        I spent 30 minutes caring for Carly Elba Buchheister on  this date of service. This time includes time spent by me in the following activities: preparing for the visit, reviewing tests, performing a medically appropriate examination and/or evaluation , counseling and educating the patient/family/caregiver, ordering medications, tests, or procedures and documenting information in the medical record        Follow Up     Return in about 3 months (around 12/14/2022), or if symptoms worsen or fail to improve, for Recheck anxiety/depression.  Patient was given instructions and counseling regarding her condition or for health maintenance advice. Please see specific information pulled into the AVS if appropriate.        Part of this note may be an electronic transcription/translation of spoken language to printed text using the Dragon Dictation System

## 2022-11-23 ENCOUNTER — TELEPHONE (OUTPATIENT)
Dept: FAMILY MEDICINE CLINIC | Facility: CLINIC | Age: 42
End: 2022-11-23

## 2022-11-23 ENCOUNTER — CLINICAL SUPPORT (OUTPATIENT)
Dept: FAMILY MEDICINE CLINIC | Facility: CLINIC | Age: 42
End: 2022-11-23

## 2022-11-23 DIAGNOSIS — R30.0 DYSURIA: Primary | ICD-10-CM

## 2022-11-23 PROCEDURE — 87086 URINE CULTURE/COLONY COUNT: CPT | Performed by: NURSE PRACTITIONER

## 2022-11-23 PROCEDURE — 81001 URINALYSIS AUTO W/SCOPE: CPT | Performed by: NURSE PRACTITIONER

## 2022-11-23 RX ORDER — CIPROFLOXACIN 250 MG/1
250 TABLET, FILM COATED ORAL 2 TIMES DAILY
Qty: 10 TABLET | Refills: 0 | Status: SHIPPED | OUTPATIENT
Start: 2022-11-23 | End: 2022-12-14

## 2022-11-23 NOTE — PROGRESS NOTES
Pt came in today to leave a urine sample. She has been having pain with urination, frequency and dysuria. She took AZO so we were unable to dip the urine in the office. It was sent to the lab for UA with culture if indicated. Provider notified.    WALE Andrew

## 2022-11-23 NOTE — TELEPHONE ENCOUNTER
Pt came in today to leave a urine sample because she is having pain with urination, frequency and dysuria. I am unable to dip the urine because she took azo and the urine is orange. Sent for UA with culture. Please advise if there is anything we should let patient know.

## 2022-11-24 LAB
BACTERIA SPEC AEROBE CULT: NO GROWTH
BACTERIA UR QL AUTO: NORMAL /HPF
BILIRUB UR QL STRIP: ABNORMAL
CLARITY UR: ABNORMAL
COLOR UR: ABNORMAL
GLUCOSE UR STRIP-MCNC: NEGATIVE MG/DL
HGB UR QL STRIP.AUTO: NEGATIVE
HYALINE CASTS UR QL AUTO: NORMAL /LPF
KETONES UR QL STRIP: NEGATIVE
LEUKOCYTE ESTERASE UR QL STRIP.AUTO: ABNORMAL
NITRITE UR QL STRIP: POSITIVE
PH UR STRIP.AUTO: <=5 [PH] (ref 5–8)
PROT UR QL STRIP: ABNORMAL
RBC # UR STRIP: NORMAL /HPF
REF LAB TEST METHOD: NORMAL
SP GR UR STRIP: 1.02 (ref 1–1.03)
SQUAMOUS #/AREA URNS HPF: NORMAL /HPF
UROBILINOGEN UR QL STRIP: ABNORMAL
WBC # UR STRIP: NORMAL /HPF

## 2022-12-14 ENCOUNTER — OFFICE VISIT (OUTPATIENT)
Dept: FAMILY MEDICINE CLINIC | Facility: CLINIC | Age: 42
End: 2022-12-14

## 2022-12-14 VITALS
OXYGEN SATURATION: 98 % | BODY MASS INDEX: 32.61 KG/M2 | DIASTOLIC BLOOD PRESSURE: 78 MMHG | HEART RATE: 87 BPM | WEIGHT: 227.8 LBS | SYSTOLIC BLOOD PRESSURE: 110 MMHG | HEIGHT: 70 IN

## 2022-12-14 DIAGNOSIS — F32.0 CURRENT MILD EPISODE OF MAJOR DEPRESSIVE DISORDER WITHOUT PRIOR EPISODE: ICD-10-CM

## 2022-12-14 DIAGNOSIS — F41.9 ANXIETY: ICD-10-CM

## 2022-12-14 DIAGNOSIS — G47.33 MILD OBSTRUCTIVE SLEEP APNEA: Primary | ICD-10-CM

## 2022-12-14 PROCEDURE — 99213 OFFICE O/P EST LOW 20 MIN: CPT | Performed by: NURSE PRACTITIONER

## 2022-12-14 RX ORDER — FLUOXETINE HYDROCHLORIDE 40 MG/1
40 CAPSULE ORAL DAILY
Qty: 90 CAPSULE | Refills: 1 | Status: SHIPPED | OUTPATIENT
Start: 2022-12-14

## 2022-12-14 NOTE — PROGRESS NOTES
Chief Complaint  Chief Complaint   Patient presents with   • Follow-up     3 month follow up for anxiety            Subjective          Carly Baron presents to [facility] for   History of Present Illness       Anxiety/depression, patient was started on Prozac in August, she presents for follow-up.  She reports improvement in condition, however still with frequent irritability and anxiety, she is not over-eating now, has lost 10 pounds. She has used hydroxyzine a few times as needed which has been effective, she also uses CBD at night.  She still worries about her health, her children and family.  She does report still experiencing fatigue.      Home sleep study was completed which shows mild obstructive sleep apnea, CPAP was ordered, she had telephone issues and was not receiving voicemails for period of time unknowingly.    Her mother has ulcerative colitis, she has questions on when to start having colonoscopies      The following portions of the patient's history were reviewed and updated as appropriate: allergies, current medications, past family history, past medical history, past social history, past surgical history and problem list.    Past Medical History:   Diagnosis Date   • Allergic    • Anxiety    • Depression    • Glaucoma 2021   • Low back pain 2012     Past Surgical History:   Procedure Laterality Date   • SINUS SURGERY     • WISDOM TOOTH EXTRACTION       Family History   Problem Relation Age of Onset   • Lung cancer Mother    • Cancer Mother    • Stroke Mother    • Early death Father    • Hypertension Father    • Cataracts Father    • No Known Problems Brother    • No Known Problems Brother    • No Known Problems Son    • No Known Problems Son    • No Known Problems Maternal Aunt    • No Known Problems Maternal Uncle    • Bone cancer Paternal Aunt    • No Known Problems Maternal Grandmother    • Parkinsonism Maternal Grandfather    • Parkinsonism Paternal  "Grandmother    • Heart disease Paternal Grandfather    • Diabetes Paternal Grandfather      Social History     Tobacco Use   • Smoking status: Never   • Smokeless tobacco: Never   Substance Use Topics   • Alcohol use: Not Currently       Current Outpatient Medications:   •  CBD (cannabidiol) oral oil, Take  by mouth., Disp: , Rfl:   •  FLUoxetine (PROzac) 40 MG capsule, Take 1 capsule by mouth Daily., Disp: 90 capsule, Rfl: 1  •  Fluticasone Propionate (FLONASE NA), into the nostril(s) as directed by provider., Disp: , Rfl:   •  hydrOXYzine (ATARAX) 10 MG tablet, Take 1 tablet by mouth 3 (Three) Times a Day As Needed for Anxiety., Disp: 30 tablet, Rfl: 1  •  Loratadine (CLARITIN PO), Take  by mouth., Disp: , Rfl:   •  multivitamin (THERAGRAN) tablet tablet, Take  by mouth Daily., Disp: , Rfl:   •  Naproxen Sodium (ALEVE PO), Take  by mouth., Disp: , Rfl:   •  valACYclovir (VALTREX) 1000 MG tablet, Take 1 tablet by mouth daily, Disp: 30 tablet, Rfl: 6    Objective   Vital Signs:   /78 (BP Location: Left arm, Patient Position: Sitting, Cuff Size: Large Adult)   Pulse 87   Ht 177.8 cm (70\")   Wt 103 kg (227 lb 12.8 oz)   SpO2 98%   BMI 32.69 kg/m²           Physical Exam  Vitals and nursing note reviewed.   Constitutional:       General: She is not in acute distress.     Appearance: Normal appearance. She is well-developed. She is obese. She is not diaphoretic.   Neck:      Thyroid: No thyromegaly.      Vascular: No JVD.   Musculoskeletal:         General: No swelling or tenderness. Normal range of motion.   Skin:     General: Skin is warm and dry.   Neurological:      General: No focal deficit present.      Mental Status: She is alert and oriented to person, place, and time. Mental status is at baseline.      Sensory: No sensory deficit.   Psychiatric:         Mood and Affect: Mood normal.         Behavior: Behavior normal.         Thought Content: Thought content normal.         Judgment: Judgment normal. "          Result Review :     No visits with results within 7 Day(s) from this visit.   Latest known visit with results is:   Clinical Support on 11/23/2022   Component Date Value Ref Range Status   • Color, UA 11/23/2022 Orange (A)  Yellow, Straw Final    Any Substance that causes an abnormal urine color can alter the accuracy of the chemical reactions.   • Appearance, UA 11/23/2022 Cloudy (A)  Clear Final   • pH, UA 11/23/2022 <=5.0  5.0 - 8.0 Final   • Specific Gravity, UA 11/23/2022 1.024  1.005 - 1.030 Final   • Glucose, UA 11/23/2022 Negative  Negative Final   • Ketones, UA 11/23/2022 Negative  Negative Final   • Bilirubin, UA 11/23/2022 Small (1+) (A)  Negative Final   • Blood, UA 11/23/2022 Negative  Negative Final   • Protein, UA 11/23/2022 30 mg/dL (1+) (A)  Negative Final   • Leuk Esterase, UA 11/23/2022 Small (1+) (A)  Negative Final   • Nitrite, UA 11/23/2022 Positive (A)  Negative Final   • Urobilinogen, UA 11/23/2022 1.0 E.U./dL  0.2 - 1.0 E.U./dL Final   • Urine Culture 11/23/2022 No growth   Final   • RBC, UA 11/23/2022 0-2  None Seen, 0-2 /HPF Final   • WBC, UA 11/23/2022 0-2  None Seen, 0-2 /HPF Final   • Bacteria, UA 11/23/2022 None Seen  None Seen /HPF Final   • Squamous Epithelial Cells, UA 11/23/2022 0-2  None Seen, 0-2 /HPF Final   • Hyaline Casts, UA 11/23/2022 0-2  None Seen /LPF Final   • Methodology 11/23/2022 Manual Light Microscopy   Final                  BMI is >= 30 and <35. (Class 1 Obesity). The following options were offered after discussion;: exercise counseling/recommendations and nutrition counseling/recommendations           Assessment and Plan    Diagnoses and all orders for this visit:    1. Mild obstructive sleep apnea (Primary)  Comments:  Patient had telephone issues, [facility] will [facility] to check status of CPAP    2. Anxiety  Comments:  Improved but still labile at times, recommend increasing Prozac to 40 mg.  Continue hydroxyzine, rec use  more often 1/2 to 1 tab prn    3. Current mild episode of major depressive disorder without prior episode (HCC)    Other orders  -     FLUoxetine (PROzac) 40 MG capsule; Take 1 capsule by mouth Daily.  Dispense: 90 capsule; Refill: 1      Recommend colonoscopy to start at age 46 unless has bowel habit changes    I spent 20 minutes caring for Carly Baron on this date of service. This time includes time spent by me in the following activities: preparing for the visit, reviewing tests, performing a medically appropriate examination and/or evaluation , counseling and educating the patient/family/caregiver, ordering medications, tests, or procedures and documenting information in the medical record        Follow Up     Return in about 3 months (around 3/14/2023) for Recheck.  Patient was given instructions and counseling regarding her condition or for health maintenance advice. Please see specific information pulled into the AVS if appropriate.        Part of this note may be an electronic transcription/translation of spoken language to printed text using the Dragon Dictation System

## 2023-03-16 ENCOUNTER — OFFICE VISIT (OUTPATIENT)
Dept: FAMILY MEDICINE CLINIC | Facility: CLINIC | Age: 43
End: 2023-03-16
Payer: COMMERCIAL

## 2023-03-16 VITALS
HEART RATE: 80 BPM | WEIGHT: 224.4 LBS | SYSTOLIC BLOOD PRESSURE: 118 MMHG | HEIGHT: 70 IN | DIASTOLIC BLOOD PRESSURE: 76 MMHG | BODY MASS INDEX: 32.13 KG/M2 | OXYGEN SATURATION: 97 %

## 2023-03-16 DIAGNOSIS — G47.33 MILD OBSTRUCTIVE SLEEP APNEA: Primary | ICD-10-CM

## 2023-03-16 DIAGNOSIS — F32.0 CURRENT MILD EPISODE OF MAJOR DEPRESSIVE DISORDER WITHOUT PRIOR EPISODE: ICD-10-CM

## 2023-03-16 DIAGNOSIS — F41.9 ANXIETY: ICD-10-CM

## 2023-03-16 DIAGNOSIS — J45.20 MILD INTERMITTENT REACTIVE AIRWAY DISEASE WITHOUT COMPLICATION: ICD-10-CM

## 2023-03-16 PROCEDURE — 99214 OFFICE O/P EST MOD 30 MIN: CPT | Performed by: NURSE PRACTITIONER

## 2023-03-16 RX ORDER — ALBUTEROL SULFATE 90 UG/1
AEROSOL, METERED RESPIRATORY (INHALATION)
Qty: 8.5 G | Refills: 2 | Status: SHIPPED | OUTPATIENT
Start: 2023-03-16

## 2023-03-16 RX ORDER — HYDROXYZINE HYDROCHLORIDE 10 MG/1
10 TABLET, FILM COATED ORAL 3 TIMES DAILY PRN
Qty: 45 TABLET | Refills: 2 | Status: SHIPPED | OUTPATIENT
Start: 2023-03-16

## 2023-03-16 NOTE — PROGRESS NOTES
Chief Complaint  Chief Complaint   Patient presents with   • Follow-up     3 month follow up for Prozac and sleep apnea            Subjective          Carly Baron presents to [facility] for   History of Present Illness     Anxiety/depression, patient was started on Prozac 20 mg in August, titrated to 40 mg in December.  She is occasionally using hydroxyzine 1-2 times daily which has been effective, continues CBD at night.  She does report overall improvement in condition and anxiety.  She does report having a significant family issue currently and reports she is reacting and coping much better now than she would have in the past.  She is no longer overeating, reports occasionally when overly anxious has a pain in the left axilla.         Home sleep study was completed 8/22/2022 which showed mild obstructive sleep apnea, CPAP ordered, she is using CPAP every night, will miss a night here and there if falls asleep on the couch.  She is tolerating it well, sleeping better, her  has told her she is no longer having episodes of apnea or grinding teeth.  She still reports having daytime fatigue, minimally improved    Allergic rhinitis, she denies having a history of asthma but reports she developed wheezing in her 20's with exercise/exertion, she has a very old albuterol inhaler and would like a refill to use prior to use as needed      The following portions of the patient's history were reviewed and updated as appropriate: allergies, current medications, past family history, past medical history, past social history, past surgical history and problem list.    Past Medical History:   Diagnosis Date   • Allergic    • Anxiety    • Depression    • Glaucoma 2021   • Headache    • Low back pain 2012     Past Surgical History:   Procedure Laterality Date   • SINUS SURGERY     • WISDOM TOOTH EXTRACTION       Family History   Problem Relation Age of Onset   • Lung cancer Mother   "  • Cancer Mother    • Stroke Mother    • Anxiety disorder Mother    • Arthritis Mother    • Depression Mother    • Early death Father    • Hypertension Father    • Cataracts Father    • No Known Problems Brother    • No Known Problems Brother    • No Known Problems Son    • No Known Problems Son    • No Known Problems Maternal Aunt    • No Known Problems Maternal Uncle    • Bone cancer Paternal Aunt    • No Known Problems Maternal Grandmother    • Parkinsonism Maternal Grandfather    • Parkinsonism Paternal Grandmother    • Heart disease Paternal Grandfather    • Diabetes Paternal Grandfather      Social History     Tobacco Use   • Smoking status: Never   • Smokeless tobacco: Never   Substance Use Topics   • Alcohol use: Not Currently       Current Outpatient Medications:   •  CBD (cannabidiol) oral oil, Take  by mouth., Disp: , Rfl:   •  FLUoxetine (PROzac) 40 MG capsule, Take 1 capsule by mouth Daily., Disp: 90 capsule, Rfl: 1  •  Fluticasone Propionate (FLONASE NA), into the nostril(s) as directed by provider., Disp: , Rfl:   •  hydrOXYzine (ATARAX) 10 MG tablet, Take 1 tablet by mouth 3 (Three) Times a Day As Needed for Anxiety., Disp: 45 tablet, Rfl: 2  •  Loratadine (CLARITIN PO), Take  by mouth., Disp: , Rfl:   •  multivitamin (THERAGRAN) tablet tablet, Take  by mouth Daily., Disp: , Rfl:   •  Naproxen Sodium (ALEVE PO), Take  by mouth., Disp: , Rfl:   •  valACYclovir (VALTREX) 1000 MG tablet, Take 1 tablet by mouth daily, Disp: 30 tablet, Rfl: 6  •  albuterol sulfate  (90 Base) MCG/ACT inhaler, Take 2 puffs 1 hour prior to exertion/exercise and every 4 hours as needed, Disp: 18 g, Rfl: 2    Objective   Vital Signs:   /76 (BP Location: Left arm, Patient Position: Sitting, Cuff Size: Large Adult)   Pulse 80   Ht 177.8 cm (70\")   Wt 102 kg (224 lb 6.4 oz)   SpO2 97%   BMI 32.20 kg/m²           Physical Exam  Vitals and nursing note reviewed.   Constitutional:       General: She is not in " acute distress.     Appearance: Normal appearance. She is well-developed. She is not ill-appearing or diaphoretic.   Eyes:      Pupils: Pupils are equal, round, and reactive to light.   Neck:      Thyroid: No thyromegaly.      Vascular: No JVD.   Pulmonary:      Effort: Pulmonary effort is normal.   Abdominal:      Tenderness: There is no abdominal tenderness.   Musculoskeletal:         General: No tenderness. Normal range of motion.      Cervical back: Normal range of motion and neck supple.   Skin:     General: Skin is warm and dry.      Comments: Bilateral axilla wnl, no lump/masses palpated   Neurological:      General: No focal deficit present.      Mental Status: She is alert and oriented to person, place, and time. Mental status is at baseline.      Sensory: No sensory deficit.   Psychiatric:         Mood and Affect: Mood normal.         Behavior: Behavior normal.         Thought Content: Thought content normal.         Judgment: Judgment normal.          Result Review :     No visits with results within 7 Day(s) from this visit.   Latest known visit with results is:   Clinical Support on 11/23/2022   Component Date Value Ref Range Status   • Color, UA 11/23/2022 Orange (A)  Yellow, Straw Final    Any Substance that causes an abnormal urine color can alter the accuracy of the chemical reactions.   • Appearance, UA 11/23/2022 Cloudy (A)  Clear Final   • pH, UA 11/23/2022 <=5.0  5.0 - 8.0 Final   • Specific Gravity, UA 11/23/2022 1.024  1.005 - 1.030 Final   • Glucose, UA 11/23/2022 Negative  Negative Final   • Ketones, UA 11/23/2022 Negative  Negative Final   • Bilirubin, UA 11/23/2022 Small (1+) (A)  Negative Final   • Blood, UA 11/23/2022 Negative  Negative Final   • Protein, UA 11/23/2022 30 mg/dL (1+) (A)  Negative Final   • Leuk Esterase, UA 11/23/2022 Small (1+) (A)  Negative Final   • Nitrite, UA 11/23/2022 Positive (A)  Negative Final   • Urobilinogen, UA 11/23/2022 1.0 E.U./dL  0.2 - 1.0 E.U./dL Final    • Urine Culture 11/23/2022 No growth   Final   • RBC, UA 11/23/2022 0-2  None Seen, 0-2 /HPF Final   • WBC, UA 11/23/2022 0-2  None Seen, 0-2 /HPF Final   • Bacteria, UA 11/23/2022 None Seen  None Seen /HPF Final   • Squamous Epithelial Cells, UA 11/23/2022 0-2  None Seen, 0-2 /HPF Final   • Hyaline Casts, UA 11/23/2022 0-2  None Seen /LPF Final   • Methodology 11/23/2022 Manual Light Microscopy   Final                  BMI is >= 30 and <35. (Class 1 Obesity). The following options were offered after discussion;: exercise counseling/recommendations and nutrition counseling/recommendations           Assessment and Plan    Diagnoses and all orders for this visit:    1. Mild obstructive sleep apnea (Primary)    2. Anxiety    3. Current mild episode of major depressive disorder without prior episode (HCC)    4. Mild intermittent reactive airway disease without complication    Other orders  -     hydrOXYzine (ATARAX) 10 MG tablet; Take 1 tablet by mouth 3 (Three) Times a Day As Needed for Anxiety.  Dispense: 45 tablet; Refill: 2  -     albuterol sulfate  (90 Base) MCG/ACT inhaler; Take 2 puffs 1 hour prior to exertion/exercise and every 4 hours as needed  Dispense: 18 g; Refill: 2    Tolerating CPAP and benefiting from therapy, continue nightly, fatigue will likely improve over time, will notify [employer]  Anxiety is improved, continue Prozac 40 mg, refill hydroxyzine for as needed use  Ordered albuterol inhaler to use as needed prior to exercise/exertion    I spent 30 minutes caring for Carly Baron on this date of service. This time includes time spent by me in the following activities: preparing for the visit, reviewing tests, performing a medically appropriate examination and/or evaluation , counseling and educating the patient/family/caregiver, ordering medications, tests, or procedures and documenting information in the medical record        Follow Up     Return in about 6 months (around 9/16/2023)  for Recheck, Annual physical.  Patient was given instructions and counseling regarding her condition or for health maintenance advice. Please see specific information pulled into the AVS if appropriate.        Part of this note may be an electronic transcription/translation of spoken language to printed text using the Dragon Dictation System

## 2023-12-29 DIAGNOSIS — F32.0 CURRENT MILD EPISODE OF MAJOR DEPRESSIVE DISORDER WITHOUT PRIOR EPISODE: ICD-10-CM

## 2023-12-29 RX ORDER — HYDROXYZINE HYDROCHLORIDE 10 MG/1
10 TABLET, FILM COATED ORAL 3 TIMES DAILY PRN
Qty: 45 TABLET | Refills: 2 | Status: SHIPPED | OUTPATIENT
Start: 2023-12-29

## 2023-12-29 RX ORDER — FLUOXETINE HYDROCHLORIDE 40 MG/1
40 CAPSULE ORAL DAILY
Qty: 90 CAPSULE | Refills: 1 | Status: SHIPPED | OUTPATIENT
Start: 2023-12-29

## 2023-12-29 RX ORDER — FLUOXETINE HYDROCHLORIDE 40 MG/1
40 CAPSULE ORAL DAILY
Qty: 90 CAPSULE | Refills: 1 | Status: CANCELLED | OUTPATIENT
Start: 2023-12-29

## 2023-12-29 RX ORDER — HYDROXYZINE HYDROCHLORIDE 10 MG/1
10 TABLET, FILM COATED ORAL 3 TIMES DAILY PRN
Qty: 45 TABLET | Refills: 2 | Status: CANCELLED | OUTPATIENT
Start: 2023-12-29

## 2024-03-26 RX ORDER — ALBUTEROL SULFATE 90 UG/1
AEROSOL, METERED RESPIRATORY (INHALATION)
Qty: 8.5 G | Refills: 2 | Status: SHIPPED | OUTPATIENT
Start: 2024-03-26

## 2024-07-31 DIAGNOSIS — F32.0 CURRENT MILD EPISODE OF MAJOR DEPRESSIVE DISORDER WITHOUT PRIOR EPISODE: ICD-10-CM

## 2024-07-31 RX ORDER — FLUOXETINE HYDROCHLORIDE 40 MG/1
40 CAPSULE ORAL DAILY
Qty: 90 CAPSULE | Refills: 1 | Status: SHIPPED | OUTPATIENT
Start: 2024-07-31

## 2024-08-08 RX ORDER — HYDROXYZINE HYDROCHLORIDE 10 MG/1
10 TABLET, FILM COATED ORAL 3 TIMES DAILY PRN
Qty: 45 TABLET | Refills: 2 | Status: CANCELLED | OUTPATIENT
Start: 2024-08-08

## 2024-08-09 RX ORDER — HYDROXYZINE HYDROCHLORIDE 10 MG/1
10 TABLET, FILM COATED ORAL 3 TIMES DAILY PRN
Qty: 45 TABLET | Refills: 2 | OUTPATIENT
Start: 2024-08-09

## 2024-08-14 RX ORDER — HYDROXYZINE HYDROCHLORIDE 10 MG/1
10 TABLET, FILM COATED ORAL 3 TIMES DAILY PRN
Qty: 45 TABLET | Refills: 2 | OUTPATIENT
Start: 2024-08-14

## 2024-10-08 ENCOUNTER — OFFICE VISIT (OUTPATIENT)
Dept: FAMILY MEDICINE CLINIC | Facility: CLINIC | Age: 44
End: 2024-10-08
Payer: COMMERCIAL

## 2024-10-08 ENCOUNTER — LAB (OUTPATIENT)
Dept: FAMILY MEDICINE CLINIC | Facility: CLINIC | Age: 44
End: 2024-10-08
Payer: COMMERCIAL

## 2024-10-08 VITALS
HEIGHT: 70 IN | BODY MASS INDEX: 34.01 KG/M2 | OXYGEN SATURATION: 98 % | SYSTOLIC BLOOD PRESSURE: 138 MMHG | HEART RATE: 98 BPM | WEIGHT: 237.6 LBS | DIASTOLIC BLOOD PRESSURE: 92 MMHG

## 2024-10-08 DIAGNOSIS — K42.9 UMBILICAL HERNIA WITHOUT OBSTRUCTION AND WITHOUT GANGRENE: ICD-10-CM

## 2024-10-08 DIAGNOSIS — Z83.79 FAMILY HISTORY OF ULCERATIVE COLITIS: ICD-10-CM

## 2024-10-08 DIAGNOSIS — R53.83 FATIGUE, UNSPECIFIED TYPE: ICD-10-CM

## 2024-10-08 DIAGNOSIS — Z00.00 PREVENTATIVE HEALTH CARE: Primary | ICD-10-CM

## 2024-10-08 DIAGNOSIS — J30.1 SEASONAL ALLERGIC RHINITIS DUE TO POLLEN: ICD-10-CM

## 2024-10-08 DIAGNOSIS — F41.9 ANXIETY: ICD-10-CM

## 2024-10-08 DIAGNOSIS — F32.0 CURRENT MILD EPISODE OF MAJOR DEPRESSIVE DISORDER WITHOUT PRIOR EPISODE: ICD-10-CM

## 2024-10-08 DIAGNOSIS — E66.811 CLASS 1 OBESITY WITHOUT SERIOUS COMORBIDITY WITH BODY MASS INDEX (BMI) OF 34.0 TO 34.9 IN ADULT, UNSPECIFIED OBESITY TYPE: ICD-10-CM

## 2024-10-08 DIAGNOSIS — G47.33 OSA ON CPAP: ICD-10-CM

## 2024-10-08 DIAGNOSIS — Z12.31 BREAST CANCER SCREENING BY MAMMOGRAM: ICD-10-CM

## 2024-10-08 PROCEDURE — 80061 LIPID PANEL: CPT | Performed by: NURSE PRACTITIONER

## 2024-10-08 PROCEDURE — 82607 VITAMIN B-12: CPT | Performed by: NURSE PRACTITIONER

## 2024-10-08 PROCEDURE — 82533 TOTAL CORTISOL: CPT | Performed by: NURSE PRACTITIONER

## 2024-10-08 PROCEDURE — 99396 PREV VISIT EST AGE 40-64: CPT | Performed by: NURSE PRACTITIONER

## 2024-10-08 PROCEDURE — 80050 GENERAL HEALTH PANEL: CPT | Performed by: NURSE PRACTITIONER

## 2024-10-08 PROCEDURE — 82306 VITAMIN D 25 HYDROXY: CPT | Performed by: NURSE PRACTITIONER

## 2024-10-08 PROCEDURE — 36415 COLL VENOUS BLD VENIPUNCTURE: CPT | Performed by: NURSE PRACTITIONER

## 2024-10-08 RX ORDER — HYDROXYZINE HYDROCHLORIDE 25 MG/1
25 TABLET, FILM COATED ORAL 3 TIMES DAILY PRN
Qty: 45 TABLET | Refills: 0 | Status: SHIPPED | OUTPATIENT
Start: 2024-10-08

## 2024-10-08 NOTE — PROGRESS NOTES
Chief Complaint  Chief Complaint   Patient presents with    Annual Exam     Has questions about colonoscopy and mammo. Needs refills on meds.            Subjective          Carly Baron presents to [facility] for   History of Present Illness    Patient presents for annual exam.  Pap smear is overdue, mammogram was last completed 2022    Anxiety, stable on Prozac, but having severe anxiety at night r/t cpap, patient denies significant weight loss/gain, insomnia, hypersomnia, psychomotor agitation, psychomotor retardation, fatigue (loss of energy), feelings of worthlessness (guilt), impaired concentration (indecisiveness), thoughts of death or suicide.       JAME, using CPAP nightly but causing severe anxiety at night and can't stay asleep        The following portions of the patient's history were reviewed and updated as appropriate: allergies, current medications, past family history, past medical history, past social history, past surgical history and problem list.    Past Medical History:   Diagnosis Date    Allergic     Anxiety     Depression     Glaucoma 2021    Headache     Low back pain 2012     Past Surgical History:   Procedure Laterality Date    SINUS SURGERY      WISDOM TOOTH EXTRACTION       Family History   Problem Relation Age of Onset    Lung cancer Mother     Cancer Mother     Stroke Mother     Anxiety disorder Mother     Arthritis Mother     Depression Mother     Early death Father     Hypertension Father     Cataracts Father     No Known Problems Brother     No Known Problems Brother     No Known Problems Son     No Known Problems Son     No Known Problems Maternal Aunt     No Known Problems Maternal Uncle     Bone cancer Paternal Aunt     No Known Problems Maternal Grandmother     Parkinsonism Maternal Grandfather     Parkinsonism Paternal Grandmother     Heart disease Paternal Grandfather     Diabetes Paternal Grandfather      Social History     Tobacco Use  "   Smoking status: Never     Passive exposure: Never    Smokeless tobacco: Never   Substance Use Topics    Alcohol use: Not Currently       Current Outpatient Medications:     hydrOXYzine (ATARAX) 25 MG tablet, Take 1 tablet by mouth 3 (Three) Times a Day As Needed for Anxiety., Disp: 45 tablet, Rfl: 0    albuterol sulfate  (90 Base) MCG/ACT inhaler, Take 2 puffs 1 hour prior to exertion/exercise and every 4 hours as needed, Disp: 8.5 g, Rfl: 2    CBD (cannabidiol) oral oil, Take  by mouth., Disp: , Rfl:     FLUoxetine (PROzac) 40 MG capsule, Take 1 capsule by mouth Daily., Disp: 90 capsule, Rfl: 1    Fluticasone Propionate (FLONASE NA), into the nostril(s) as directed by provider., Disp: , Rfl:     Loratadine (CLARITIN PO), Take  by mouth., Disp: , Rfl:     multivitamin (THERAGRAN) tablet tablet, Take  by mouth Daily., Disp: , Rfl:     Naproxen Sodium (ALEVE PO), Take  by mouth., Disp: , Rfl:     norgestimate-ethinyl estradiol (Tri-Lo-Sprintec) 0.18/0.215/0.25 MG-25 MCG per tablet, Take 1 tablet by mouth Daily., Disp: 84 tablet, Rfl: 4    valACYclovir (Valtrex) 1000 MG tablet, Take 1 tablet by mouth Daily., Disp: 30 tablet, Rfl: 6    Objective   Vital Signs:   /92 (BP Location: Left arm, Patient Position: Sitting, Cuff Size: Adult)   Pulse 98   Ht 177.8 cm (70\")   Wt 108 kg (237 lb 9.6 oz)   SpO2 98%   BMI 34.09 kg/m²           Physical Exam  Constitutional:       General: She is not in acute distress.     Appearance: Normal appearance. She is well-developed. She is obese. She is not ill-appearing or diaphoretic.   HENT:      Head: Normocephalic.   Eyes:      Conjunctiva/sclera: Conjunctivae normal.      Pupils: Pupils are equal, round, and reactive to light.   Neck:      Thyroid: No thyromegaly.      Vascular: No JVD.   Cardiovascular:      Rate and Rhythm: Normal rate and regular rhythm.      Heart sounds: Normal heart sounds. No murmur heard.  Pulmonary:      Effort: Pulmonary effort is normal. " No respiratory distress.      Breath sounds: Normal breath sounds. No wheezing or rhonchi.   Abdominal:      General: Bowel sounds are normal. There is no distension.      Palpations: Abdomen is soft.      Tenderness: There is no abdominal tenderness.      Hernia: A hernia (umbilical) is present.   Musculoskeletal:         General: No swelling or tenderness. Normal range of motion.      Cervical back: Normal range of motion and neck supple. No tenderness.   Lymphadenopathy:      Cervical: No cervical adenopathy.   Skin:     General: Skin is warm and dry.      Coloration: Skin is not jaundiced.      Findings: No erythema or rash.   Neurological:      General: No focal deficit present.      Mental Status: She is alert and oriented to person, place, and time. Mental status is at baseline.      Sensory: No sensory deficit.      Motor: No weakness.      Gait: Gait normal.   Psychiatric:         Mood and Affect: Mood normal.         Behavior: Behavior normal.         Thought Content: Thought content normal.         Judgment: Judgment normal.          Result Review :     Office Visit on 10/08/2024   Component Date Value Ref Range Status    Total Cholesterol 10/08/2024 139  0 - 200 mg/dL Final    Triglycerides 10/08/2024 171 (H)  0 - 150 mg/dL Final    HDL Cholesterol 10/08/2024 46  40 - 60 mg/dL Final    LDL Cholesterol  10/08/2024 64  0 - 100 mg/dL Final    VLDL Cholesterol 10/08/2024 29  5 - 40 mg/dL Final    LDL/HDL Ratio 10/08/2024 1.28   Final    Glucose 10/08/2024 97  65 - 99 mg/dL Final    BUN 10/08/2024 11  6 - 20 mg/dL Final    Creatinine 10/08/2024 0.81  0.57 - 1.00 mg/dL Final    Sodium 10/08/2024 136  136 - 145 mmol/L Final    Potassium 10/08/2024 4.2  3.5 - 5.2 mmol/L Final    Chloride 10/08/2024 102  98 - 107 mmol/L Final    CO2 10/08/2024 26.0  22.0 - 29.0 mmol/L Final    Calcium 10/08/2024 9.5  8.6 - 10.5 mg/dL Final    Total Protein 10/08/2024 7.8  6.0 - 8.5 g/dL Final    Albumin 10/08/2024 4.4  3.5 - 5.2  g/dL Final    ALT (SGPT) 10/08/2024 23  1 - 33 U/L Final    AST (SGOT) 10/08/2024 25  1 - 32 U/L Final    Alkaline Phosphatase 10/08/2024 102  39 - 117 U/L Final    Total Bilirubin 10/08/2024 0.5  0.0 - 1.2 mg/dL Final    Globulin 10/08/2024 3.4  gm/dL Final    A/G Ratio 10/08/2024 1.3  g/dL Final    BUN/Creatinine Ratio 10/08/2024 13.6  7.0 - 25.0 Final    Anion Gap 10/08/2024 8.0  5.0 - 15.0 mmol/L Final    eGFR 10/08/2024 92.5  >60.0 mL/min/1.73 Final    WBC 10/08/2024 10.18  3.40 - 10.80 10*3/mm3 Final    RBC 10/08/2024 4.90  3.77 - 5.28 10*6/mm3 Final    Hemoglobin 10/08/2024 13.9  12.0 - 15.9 g/dL Final    Hematocrit 10/08/2024 42.9  34.0 - 46.6 % Final    MCV 10/08/2024 87.6  79.0 - 97.0 fL Final    MCH 10/08/2024 28.4  26.6 - 33.0 pg Final    MCHC 10/08/2024 32.4  31.5 - 35.7 g/dL Final    RDW 10/08/2024 12.7  12.3 - 15.4 % Final    RDW-SD 10/08/2024 40.7  37.0 - 54.0 fl Final    MPV 10/08/2024 12.0  6.0 - 12.0 fL Final    Platelets 10/08/2024 280  140 - 450 10*3/mm3 Final    TSH 10/08/2024 0.635  0.270 - 4.200 uIU/mL Final    25 Hydroxy, Vitamin D 10/08/2024 40.5  30.0 - 100.0 ng/ml Final    Vitamin B-12 10/08/2024 585  211 - 946 pg/mL Final    Cortisol 10/08/2024 7.53    mcg/dL Final                  BMI is >= 30 and <35. (Class 1 Obesity). The following options were offered after discussion;: exercise counseling/recommendations and nutrition counseling/recommendations           Assessment and Plan    Diagnoses and all orders for this visit:    1. Preventative health care (Primary)  -     Lipid Panel  -     Comprehensive Metabolic Panel  -     CBC (No Diff)  -     TSH  -     Vitamin D,25-Hydroxy  -     Vitamin B12    2. Current mild episode of major depressive disorder without prior episode    3. Anxiety    4. JAME on CPAP    5. Seasonal allergic rhinitis due to pollen    6. Fatigue, unspecified type  -     Vitamin D,25-Hydroxy  -     Vitamin B12  -     Cortisol    7. Breast cancer screening by mammogram  -      Mammo Screening Digital Tomosynthesis Bilateral With CAD; Future    8. Family history of ulcerative colitis    9. Class 1 obesity without serious comorbidity with body mass index (BMI) of 34.0 to 34.9 in adult, unspecified obesity type  -     Cortisol    10. Umbilical hernia without obstruction and without gangrene    Other orders  -     hydrOXYzine (ATARAX) 25 MG tablet; Take 1 tablet by mouth 3 (Three) Times a Day As Needed for Anxiety.  Dispense: 45 tablet; Refill: 0        C-scope at age 46  Labs today   Cpap [facility], continue nightly, benefiting from therapy  Consider trazodone  Age appropriate preventative counseling provided, including healthy lifestyle modifications and exercise        I spent 30 minutes caring for Carly Baron on this date of service. This time includes time spent by me in the following activities: preparing for the visit, reviewing tests, performing a medically appropriate examination and/or evaluation , counseling and educating the patient/family/caregiver, ordering medications, tests, or procedures and documenting information in the medical record        Follow Up     Return in about 6 months (around 4/8/2025) for Recheck.  Patient was given instructions and counseling regarding her condition or for health maintenance advice. Please see specific information pulled into the AVS if appropriate.        Part of this note may be an electronic transcription/translation of spoken language to printed text using the Dragon Dictation System

## 2024-10-09 LAB
25(OH)D3 SERPL-MCNC: 40.5 NG/ML (ref 30–100)
ALBUMIN SERPL-MCNC: 4.4 G/DL (ref 3.5–5.2)
ALBUMIN/GLOB SERPL: 1.3 G/DL
ALP SERPL-CCNC: 102 U/L (ref 39–117)
ALT SERPL W P-5'-P-CCNC: 23 U/L (ref 1–33)
ANION GAP SERPL CALCULATED.3IONS-SCNC: 8 MMOL/L (ref 5–15)
AST SERPL-CCNC: 25 U/L (ref 1–32)
BILIRUB SERPL-MCNC: 0.5 MG/DL (ref 0–1.2)
BUN SERPL-MCNC: 11 MG/DL (ref 6–20)
BUN/CREAT SERPL: 13.6 (ref 7–25)
CALCIUM SPEC-SCNC: 9.5 MG/DL (ref 8.6–10.5)
CHLORIDE SERPL-SCNC: 102 MMOL/L (ref 98–107)
CHOLEST SERPL-MCNC: 139 MG/DL (ref 0–200)
CO2 SERPL-SCNC: 26 MMOL/L (ref 22–29)
CORTIS SERPL-MCNC: 7.53 MCG/DL
CREAT SERPL-MCNC: 0.81 MG/DL (ref 0.57–1)
DEPRECATED RDW RBC AUTO: 40.7 FL (ref 37–54)
EGFRCR SERPLBLD CKD-EPI 2021: 92.5 ML/MIN/1.73
ERYTHROCYTE [DISTWIDTH] IN BLOOD BY AUTOMATED COUNT: 12.7 % (ref 12.3–15.4)
GLOBULIN UR ELPH-MCNC: 3.4 GM/DL
GLUCOSE SERPL-MCNC: 97 MG/DL (ref 65–99)
HCT VFR BLD AUTO: 42.9 % (ref 34–46.6)
HDLC SERPL-MCNC: 46 MG/DL (ref 40–60)
HGB BLD-MCNC: 13.9 G/DL (ref 12–15.9)
LDLC SERPL CALC-MCNC: 64 MG/DL (ref 0–100)
LDLC/HDLC SERPL: 1.28 {RATIO}
MCH RBC QN AUTO: 28.4 PG (ref 26.6–33)
MCHC RBC AUTO-ENTMCNC: 32.4 G/DL (ref 31.5–35.7)
MCV RBC AUTO: 87.6 FL (ref 79–97)
PLATELET # BLD AUTO: 280 10*3/MM3 (ref 140–450)
PMV BLD AUTO: 12 FL (ref 6–12)
POTASSIUM SERPL-SCNC: 4.2 MMOL/L (ref 3.5–5.2)
PROT SERPL-MCNC: 7.8 G/DL (ref 6–8.5)
RBC # BLD AUTO: 4.9 10*6/MM3 (ref 3.77–5.28)
SODIUM SERPL-SCNC: 136 MMOL/L (ref 136–145)
TRIGL SERPL-MCNC: 171 MG/DL (ref 0–150)
TSH SERPL DL<=0.05 MIU/L-ACNC: 0.64 UIU/ML (ref 0.27–4.2)
VIT B12 BLD-MCNC: 585 PG/ML (ref 211–946)
VLDLC SERPL-MCNC: 29 MG/DL (ref 5–40)
WBC NRBC COR # BLD AUTO: 10.18 10*3/MM3 (ref 3.4–10.8)

## 2024-10-16 ENCOUNTER — HOSPITAL ENCOUNTER (OUTPATIENT)
Dept: MAMMOGRAPHY | Facility: HOSPITAL | Age: 44
Discharge: HOME | End: 2024-10-16
Admitting: NURSE PRACTITIONER
Payer: COMMERCIAL

## 2024-10-16 DIAGNOSIS — Z12.31 BREAST CANCER SCREENING BY MAMMOGRAM: ICD-10-CM

## 2024-10-16 PROCEDURE — 77067 SCR MAMMO BI INCL CAD: CPT

## 2024-10-16 PROCEDURE — 77063 BREAST TOMOSYNTHESIS BI: CPT

## 2024-12-10 RX ORDER — HYDROXYZINE HYDROCHLORIDE 25 MG/1
25 TABLET, FILM COATED ORAL 3 TIMES DAILY PRN
Qty: 45 TABLET | Refills: 0 | Status: SHIPPED | OUTPATIENT
Start: 2024-12-10

## 2025-01-15 RX ORDER — TRAZODONE HYDROCHLORIDE 50 MG/1
50 TABLET, FILM COATED ORAL NIGHTLY
Qty: 30 TABLET | Refills: 0 | Status: SHIPPED | OUTPATIENT
Start: 2025-01-15

## 2025-01-28 RX ORDER — HYDROXYZINE HYDROCHLORIDE 25 MG/1
25 TABLET, FILM COATED ORAL 3 TIMES DAILY PRN
Qty: 45 TABLET | Refills: 0 | Status: SHIPPED | OUTPATIENT
Start: 2025-01-28

## 2025-01-28 RX ORDER — ALBUTEROL SULFATE 90 UG/1
INHALANT RESPIRATORY (INHALATION)
Qty: 8.5 G | Refills: 2 | Status: SHIPPED | OUTPATIENT
Start: 2025-01-28

## 2025-02-13 RX ORDER — ESOMEPRAZOLE MAGNESIUM 40 MG/1
40 CAPSULE, DELAYED RELEASE ORAL
Qty: 30 CAPSULE | Refills: 2 | Status: SHIPPED | OUTPATIENT
Start: 2025-02-13

## 2025-03-05 DIAGNOSIS — F32.0 CURRENT MILD EPISODE OF MAJOR DEPRESSIVE DISORDER WITHOUT PRIOR EPISODE: ICD-10-CM

## 2025-03-05 RX ORDER — FLUOXETINE HYDROCHLORIDE 40 MG/1
40 CAPSULE ORAL DAILY
Qty: 90 CAPSULE | Refills: 1 | Status: SHIPPED | OUTPATIENT
Start: 2025-03-05

## 2025-03-05 RX ORDER — HYDROXYZINE HYDROCHLORIDE 25 MG/1
25 TABLET, FILM COATED ORAL 3 TIMES DAILY PRN
Qty: 45 TABLET | Refills: 0 | Status: SHIPPED | OUTPATIENT
Start: 2025-03-05

## 2025-04-08 ENCOUNTER — OFFICE VISIT (OUTPATIENT)
Dept: FAMILY MEDICINE CLINIC | Facility: CLINIC | Age: 45
End: 2025-04-08
Payer: COMMERCIAL

## 2025-04-08 VITALS
SYSTOLIC BLOOD PRESSURE: 119 MMHG | BODY MASS INDEX: 35.45 KG/M2 | TEMPERATURE: 98.7 F | HEIGHT: 70 IN | DIASTOLIC BLOOD PRESSURE: 82 MMHG | HEART RATE: 85 BPM | OXYGEN SATURATION: 97 % | WEIGHT: 247.6 LBS

## 2025-04-08 DIAGNOSIS — N95.1 PERIMENOPAUSAL VASOMOTOR SYMPTOMS: ICD-10-CM

## 2025-04-08 DIAGNOSIS — G47.33 OSA ON CPAP: ICD-10-CM

## 2025-04-08 DIAGNOSIS — K21.9 GASTROESOPHAGEAL REFLUX DISEASE WITHOUT ESOPHAGITIS: ICD-10-CM

## 2025-04-08 DIAGNOSIS — G47.09 OTHER INSOMNIA: ICD-10-CM

## 2025-04-08 DIAGNOSIS — F41.9 ANXIETY: ICD-10-CM

## 2025-04-08 DIAGNOSIS — F32.0 CURRENT MILD EPISODE OF MAJOR DEPRESSIVE DISORDER WITHOUT PRIOR EPISODE: Primary | ICD-10-CM

## 2025-04-08 PROCEDURE — 99214 OFFICE O/P EST MOD 30 MIN: CPT | Performed by: NURSE PRACTITIONER

## 2025-04-08 RX ORDER — HYDROXYZINE HYDROCHLORIDE 25 MG/1
25 TABLET, FILM COATED ORAL 3 TIMES DAILY PRN
Qty: 45 TABLET | Refills: 2 | Status: SHIPPED | OUTPATIENT
Start: 2025-04-08

## 2025-04-08 RX ORDER — VENLAFAXINE HYDROCHLORIDE 37.5 MG/1
37.5 CAPSULE, EXTENDED RELEASE ORAL DAILY
Qty: 30 CAPSULE | Refills: 2 | Status: SHIPPED | OUTPATIENT
Start: 2025-04-08

## 2025-04-08 NOTE — PROGRESS NOTES
Chief Complaint  Chief Complaint   Patient presents with    Follow-up     6 month  Pt would like to discuss medications           Subjective          Carly Baron presents to [facility] for   History of Present Illness    Anxiety, labile on Prozac, she is not doing well and still having severe anxiety at night r/t cpap, hydrox does not seem as effective now. Tried lexapro in past, became ineffective.  She also reports having severe fatigue, hot flashes and perimenopausal type symptoms.        JAME, she is still trying to use CPAP nightly but causing severe anxiety with having something on her face, can't stay asleep, hydroxyzine as needed was not effective in keeping her sleep and trazodone 50mg was added as well but sluggish in the morning. She is trying her best to get used to cpap but is not getting easier. She is thinking about seeing Dr. Landaverde for evaluation.     GERD, patient had 2 weeks of severe reflux, she was started on Nexium, symptoms have resolved, she is now using as needed.  Denies nausea, vomiting, constipation, abdominal pain and diarrhea.      The following portions of the patient's history were reviewed and updated as appropriate: allergies, current medications, past family history, past medical history, past social history, past surgical history and problem list.    Past Medical History:   Diagnosis Date    Allergic     Anxiety     Depression     Glaucoma 2021    Headache     Low back pain 2012     Past Surgical History:   Procedure Laterality Date    SINUS SURGERY      WISDOM TOOTH EXTRACTION       Family History   Problem Relation Age of Onset    Lung cancer Mother     Cancer Mother     Stroke Mother     Anxiety disorder Mother     Arthritis Mother     Depression Mother     Early death Father     Hypertension Father     Cataracts Father     No Known Problems Brother     No Known Problems Brother     No Known Problems Son     No Known Problems Son     No  Known Problems Maternal Aunt     No Known Problems Maternal Uncle     Bone cancer Paternal Aunt     No Known Problems Maternal Grandmother     Parkinsonism Maternal Grandfather     Parkinsonism Paternal Grandmother     Heart disease Paternal Grandfather     Diabetes Paternal Grandfather      Social History     Tobacco Use    Smoking status: Never     Passive exposure: Never    Smokeless tobacco: Never   Substance Use Topics    Alcohol use: Not Currently       Current Outpatient Medications:     albuterol sulfate  (90 Base) MCG/ACT inhaler, Take 2 puffs 1 hour prior to exertion/exercise and every 4 hours as needed, Disp: 8.5 g, Rfl: 2    CBD (cannabidiol) oral oil, Take  by mouth., Disp: , Rfl:     esomeprazole (nexIUM) 40 MG capsule, Take 1 capsule by mouth Every Morning Before Breakfast., Disp: 30 capsule, Rfl: 2    Fluticasone Propionate (FLONASE NA), into the nostril(s) as directed by provider., Disp: , Rfl:     hydrOXYzine (ATARAX) 25 MG tablet, Take 1 tablet by mouth 3 (Three) Times a Day As Needed for Anxiety., Disp: 45 tablet, Rfl: 2    Loratadine (CLARITIN PO), Take  by mouth., Disp: , Rfl:     multivitamin (THERAGRAN) tablet tablet, Take  by mouth Daily., Disp: , Rfl:     Naproxen Sodium (ALEVE PO), Take  by mouth., Disp: , Rfl:     Norgestimate-Eth Estradiol (Tri-Lo-Sprintec) 0.18/0.215/0.25 MG-25 MCG tablet, Take 1 tablet by mouth Daily., Disp: 84 tablet, Rfl: 4    traZODone (DESYREL) 50 MG tablet, Take 1 tablet by mouth Every Night., Disp: 30 tablet, Rfl: 0    valACYclovir (Valtrex) 1000 MG tablet, Take 1 tablet by mouth daily, Disp: 90 tablet, Rfl: 4    venlafaxine XR (Effexor XR) 37.5 MG 24 hr capsule, Take 1 capsule by mouth Daily., Disp: 30 capsule, Rfl: 2    Objective   Vital Signs:   Vitals:    04/08/25 1309   BP: 119/82   BP Location: Left arm   Patient Position: Sitting   Cuff Size: Adult   Pulse: 85   Temp: 98.7 °F (37.1 °C)   TempSrc: Oral   SpO2: 97%   Weight: 112 kg (247 lb 9.6 oz)  "  Height: 177.8 cm (70\")   PainSc: 0-No pain       Body mass index is 35.53 kg/m².    Physical Exam  Constitutional:       General: She is not in acute distress.     Appearance: Normal appearance. She is well-developed. She is obese. She is not ill-appearing or diaphoretic.   HENT:      Head: Normocephalic.   Eyes:      Conjunctiva/sclera: Conjunctivae normal.      Pupils: Pupils are equal, round, and reactive to light.   Neck:      Thyroid: No thyromegaly.      Vascular: No JVD.   Cardiovascular:      Rate and Rhythm: Normal rate and regular rhythm.      Heart sounds: Normal heart sounds. No murmur heard.  Pulmonary:      Effort: Pulmonary effort is normal. No respiratory distress.      Breath sounds: Normal breath sounds. No wheezing or rhonchi.   Abdominal:      General: Bowel sounds are normal. There is no distension.      Palpations: Abdomen is soft.      Tenderness: There is no abdominal tenderness.   Musculoskeletal:         General: No swelling or tenderness. Normal range of motion.      Cervical back: Normal range of motion and neck supple. No tenderness.   Lymphadenopathy:      Cervical: No cervical adenopathy.   Skin:     General: Skin is warm and dry.      Coloration: Skin is not jaundiced.      Findings: No erythema or rash.   Neurological:      General: No focal deficit present.      Mental Status: She is alert and oriented to person, place, and time. Mental status is at baseline.      Sensory: No sensory deficit.      Motor: No weakness.      Gait: Gait normal.   Psychiatric:         Attention and Perception: Attention normal.         Mood and Affect: Mood normal. Mood is anxious and depressed. Affect is tearful.         Behavior: Behavior normal.         Thought Content: Thought content normal.         Judgment: Judgment normal.          Result Review :     No visits with results within 7 Day(s) from this visit.   Latest known visit with results is:   Office Visit on 10/08/2024   Component Date Value " Ref Range Status    Total Cholesterol 10/08/2024 139  0 - 200 mg/dL Final    Triglycerides 10/08/2024 171 (H)  0 - 150 mg/dL Final    HDL Cholesterol 10/08/2024 46  40 - 60 mg/dL Final    LDL Cholesterol  10/08/2024 64  0 - 100 mg/dL Final    VLDL Cholesterol 10/08/2024 29  5 - 40 mg/dL Final    LDL/HDL Ratio 10/08/2024 1.28   Final    Glucose 10/08/2024 97  65 - 99 mg/dL Final    BUN 10/08/2024 11  6 - 20 mg/dL Final    Creatinine 10/08/2024 0.81  0.57 - 1.00 mg/dL Final    Sodium 10/08/2024 136  136 - 145 mmol/L Final    Potassium 10/08/2024 4.2  3.5 - 5.2 mmol/L Final    Chloride 10/08/2024 102  98 - 107 mmol/L Final    CO2 10/08/2024 26.0  22.0 - 29.0 mmol/L Final    Calcium 10/08/2024 9.5  8.6 - 10.5 mg/dL Final    Total Protein 10/08/2024 7.8  6.0 - 8.5 g/dL Final    Albumin 10/08/2024 4.4  3.5 - 5.2 g/dL Final    ALT (SGPT) 10/08/2024 23  1 - 33 U/L Final    AST (SGOT) 10/08/2024 25  1 - 32 U/L Final    Alkaline Phosphatase 10/08/2024 102  39 - 117 U/L Final    Total Bilirubin 10/08/2024 0.5  0.0 - 1.2 mg/dL Final    Globulin 10/08/2024 3.4  gm/dL Final    A/G Ratio 10/08/2024 1.3  g/dL Final    BUN/Creatinine Ratio 10/08/2024 13.6  7.0 - 25.0 Final    Anion Gap 10/08/2024 8.0  5.0 - 15.0 mmol/L Final    eGFR 10/08/2024 92.5  >60.0 mL/min/1.73 Final    WBC 10/08/2024 10.18  3.40 - 10.80 10*3/mm3 Final    RBC 10/08/2024 4.90  3.77 - 5.28 10*6/mm3 Final    Hemoglobin 10/08/2024 13.9  12.0 - 15.9 g/dL Final    Hematocrit 10/08/2024 42.9  34.0 - 46.6 % Final    MCV 10/08/2024 87.6  79.0 - 97.0 fL Final    MCH 10/08/2024 28.4  26.6 - 33.0 pg Final    MCHC 10/08/2024 32.4  31.5 - 35.7 g/dL Final    RDW 10/08/2024 12.7  12.3 - 15.4 % Final    RDW-SD 10/08/2024 40.7  37.0 - 54.0 fl Final    MPV 10/08/2024 12.0  6.0 - 12.0 fL Final    Platelets 10/08/2024 280  140 - 450 10*3/mm3 Final    TSH 10/08/2024 0.635  0.270 - 4.200 uIU/mL Final    25 Hydroxy, Vitamin D 10/08/2024 40.5  30.0 - 100.0 ng/ml Final    Vitamin B-12  10/08/2024 585  211 - 946 pg/mL Final    Cortisol 10/08/2024 7.53    mcg/dL Final                              Assessment and Plan    Diagnoses and all orders for this visit:    1. Current mild episode of major depressive disorder without prior episode (Primary)  -     Ambulatory Referral to Psychiatry    2. Anxiety  -     Ambulatory Referral to Psychiatry    3. JAME on CPAP    4. Perimenopausal vasomotor symptoms    5. Other insomnia  -     Ambulatory Referral to Psychiatry    6. Gastroesophageal reflux disease without esophagitis    Other orders  -     venlafaxine XR (Effexor XR) 37.5 MG 24 hr capsule; Take 1 capsule by mouth Daily.  Dispense: 30 capsule; Refill: 2  -     hydrOXYzine (ATARAX) 25 MG tablet; Take 1 tablet by mouth 3 (Three) Times a Day As Needed for Anxiety.  Dispense: 45 tablet; Refill: 2      Will d/c prozac, trial Effexor.  Continue hydroxyzine as needed  Recommend cutting trazodone in half to use 25 mg nightly  Consider [facility]  Referral to psychiatry for further evaluation  Continue attempting CPAP, it is reasonable to see Dr. Landaverde for other options, ?nasal pillow. She is not tolerating cpap  Ok to use nexium prn      I spent 30 minutes caring for Carly Baron on this date of service. This time includes time spent by me in the following activities: preparing for the visit, reviewing tests, performing a medically appropriate examination and/or evaluation , counseling and educating the patient/family/caregiver, ordering medications, tests, or procedures and documenting information in the medical record        Follow Up     Return in about 6 months (around 10/8/2025) for Recheck.  Patient was given instructions and counseling regarding her condition or for health maintenance advice. Please see specific information pulled into the AVS if appropriate.        Part of this note may be an electronic transcription/translation of spoken language to printed text using the Dragon Dictation  System

## 2025-04-18 RX ORDER — BACLOFEN 10 MG/1
10 TABLET ORAL 3 TIMES DAILY
Qty: 20 TABLET | Refills: 0 | Status: SHIPPED | OUTPATIENT
Start: 2025-04-18

## 2025-05-02 RX ORDER — ESOMEPRAZOLE MAGNESIUM 40 MG/1
40 CAPSULE, DELAYED RELEASE ORAL
Qty: 30 CAPSULE | Refills: 2 | Status: SHIPPED | OUTPATIENT
Start: 2025-05-02

## 2025-05-21 RX ORDER — VENLAFAXINE HYDROCHLORIDE 75 MG/1
75 CAPSULE, EXTENDED RELEASE ORAL DAILY
Qty: 90 CAPSULE | Refills: 1 | Status: SHIPPED | OUTPATIENT
Start: 2025-05-21

## 2025-07-12 RX ORDER — HYDROXYZINE HYDROCHLORIDE 25 MG/1
25 TABLET, FILM COATED ORAL 3 TIMES DAILY PRN
Qty: 45 TABLET | Refills: 2 | Status: SHIPPED | OUTPATIENT
Start: 2025-07-12

## 2025-08-12 RX ORDER — ESOMEPRAZOLE MAGNESIUM 40 MG/1
40 CAPSULE, DELAYED RELEASE ORAL
Qty: 30 CAPSULE | Refills: 2 | Status: SHIPPED | OUTPATIENT
Start: 2025-08-12